# Patient Record
Sex: MALE | Race: WHITE | NOT HISPANIC OR LATINO | Employment: FULL TIME | URBAN - METROPOLITAN AREA
[De-identification: names, ages, dates, MRNs, and addresses within clinical notes are randomized per-mention and may not be internally consistent; named-entity substitution may affect disease eponyms.]

---

## 2023-02-11 PROBLEM — F32.A DEPRESSION: Status: ACTIVE | Noted: 2023-02-11

## 2023-02-11 PROBLEM — S83.207A TEAR OF MENISCUS OF LEFT KNEE: Status: ACTIVE | Noted: 2023-02-11

## 2023-02-11 PROBLEM — R22.9 SKIN NODULE: Status: ACTIVE | Noted: 2023-02-11

## 2023-02-11 PROBLEM — F41.9 ANXIETY: Status: ACTIVE | Noted: 2023-02-11

## 2023-02-11 PROBLEM — E78.5 HYPERLIPIDEMIA: Status: ACTIVE | Noted: 2023-02-11

## 2023-02-11 PROBLEM — R73.9 HYPERGLYCEMIA: Status: ACTIVE | Noted: 2023-02-11

## 2023-02-11 PROBLEM — D22.9 ATYPICAL NEVUS: Status: ACTIVE | Noted: 2023-02-11

## 2023-02-11 PROBLEM — N40.0 PROSTATIC ENLARGEMENT: Status: ACTIVE | Noted: 2023-02-11

## 2023-02-11 PROBLEM — Z87.2 HISTORY OF PSORIASIS: Status: ACTIVE | Noted: 2023-02-11

## 2023-02-11 PROBLEM — N52.9 MALE ERECTILE DISORDER: Status: ACTIVE | Noted: 2023-02-11

## 2023-02-11 PROBLEM — B02.9 SHINGLES: Status: ACTIVE | Noted: 2023-02-11

## 2023-02-11 PROBLEM — I10 HYPERTENSION: Status: ACTIVE | Noted: 2023-02-11

## 2023-02-11 PROBLEM — M25.562 KNEE PAIN, LEFT: Status: ACTIVE | Noted: 2023-02-11

## 2023-02-11 PROBLEM — K21.9 GERD (GASTROESOPHAGEAL REFLUX DISEASE): Status: ACTIVE | Noted: 2023-02-11

## 2023-02-11 PROBLEM — J30.9 ALLERGIC RHINITIS: Status: ACTIVE | Noted: 2023-02-11

## 2023-02-11 PROBLEM — K92.1 HEMATOCHEZIA: Status: ACTIVE | Noted: 2023-02-11

## 2023-02-11 RX ORDER — LISINOPRIL 10 MG/1
TABLET ORAL
COMMUNITY
Start: 2022-02-01 | End: 2023-12-12 | Stop reason: SDUPTHER

## 2023-02-11 RX ORDER — TADALAFIL 20 MG/1
TABLET ORAL
COMMUNITY
Start: 2013-04-20 | End: 2023-05-04 | Stop reason: SDUPTHER

## 2023-02-11 RX ORDER — CLOBETASOL PROPIONATE 0.5 MG/G
CREAM TOPICAL
COMMUNITY
End: 2023-12-12 | Stop reason: ALTCHOICE

## 2023-02-11 RX ORDER — PANTOPRAZOLE SODIUM 40 MG/1
TABLET, DELAYED RELEASE ORAL
COMMUNITY
End: 2023-12-12 | Stop reason: SDUPTHER

## 2023-02-11 RX ORDER — SERTRALINE HYDROCHLORIDE 50 MG/1
TABLET, FILM COATED ORAL
COMMUNITY
Start: 2014-03-31 | End: 2023-12-12 | Stop reason: SDUPTHER

## 2023-02-11 RX ORDER — TRAZODONE HYDROCHLORIDE 100 MG/1
TABLET ORAL
COMMUNITY
Start: 2014-03-31 | End: 2023-12-12 | Stop reason: SDUPTHER

## 2023-02-11 RX ORDER — ATORVASTATIN CALCIUM 10 MG/1
TABLET, FILM COATED ORAL
COMMUNITY
Start: 2022-02-01 | End: 2023-12-12 | Stop reason: SDUPTHER

## 2023-03-14 ENCOUNTER — APPOINTMENT (OUTPATIENT)
Dept: PRIMARY CARE | Facility: CLINIC | Age: 53
End: 2023-03-14

## 2023-05-04 ENCOUNTER — TELEPHONE (OUTPATIENT)
Dept: PRIMARY CARE | Facility: CLINIC | Age: 53
End: 2023-05-04
Payer: COMMERCIAL

## 2023-05-04 DIAGNOSIS — N52.9 MALE ERECTILE DISORDER: ICD-10-CM

## 2023-05-04 NOTE — TELEPHONE ENCOUNTER
Refill:     Tadalafil 20mg take one tab prn     Pharm: [phone]    LR: 01/31/23 qty 18 w/ 1 refill  LV: 03/14/23  NV: no future appt

## 2023-05-05 RX ORDER — TADALAFIL 20 MG/1
TABLET ORAL
Qty: 18 TABLET | Refills: 0 | Status: SHIPPED | OUTPATIENT
Start: 2023-05-05 | End: 2023-06-13 | Stop reason: SDUPTHER

## 2023-06-13 ENCOUNTER — OFFICE VISIT (OUTPATIENT)
Dept: PRIMARY CARE | Facility: CLINIC | Age: 53
End: 2023-06-13
Payer: COMMERCIAL

## 2023-06-13 VITALS
DIASTOLIC BLOOD PRESSURE: 72 MMHG | HEIGHT: 70 IN | BODY MASS INDEX: 23.48 KG/M2 | WEIGHT: 164 LBS | SYSTOLIC BLOOD PRESSURE: 148 MMHG | TEMPERATURE: 98.1 F

## 2023-06-13 DIAGNOSIS — R73.9 HYPERGLYCEMIA: ICD-10-CM

## 2023-06-13 DIAGNOSIS — N52.9 MALE ERECTILE DISORDER: ICD-10-CM

## 2023-06-13 DIAGNOSIS — E78.5 HYPERLIPIDEMIA, UNSPECIFIED HYPERLIPIDEMIA TYPE: ICD-10-CM

## 2023-06-13 PROCEDURE — 99214 OFFICE O/P EST MOD 30 MIN: CPT | Performed by: FAMILY MEDICINE

## 2023-06-13 PROCEDURE — 3078F DIAST BP <80 MM HG: CPT | Performed by: FAMILY MEDICINE

## 2023-06-13 PROCEDURE — 3077F SYST BP >= 140 MM HG: CPT | Performed by: FAMILY MEDICINE

## 2023-06-13 PROCEDURE — 1036F TOBACCO NON-USER: CPT | Performed by: FAMILY MEDICINE

## 2023-06-13 RX ORDER — TADALAFIL 20 MG/1
TABLET ORAL
Qty: 18 TABLET | Refills: 0 | Status: SHIPPED | OUTPATIENT
Start: 2023-06-13 | End: 2023-07-19

## 2023-06-13 NOTE — PROGRESS NOTES
Subjective   Patient ID: [id number]     Trenton Okeefe is a 52 y.o. male who presents for Med Management.    HPI  Having some pain in his knees and hips but is doing ok at work    Review of Systems  GEN-no unexplained fever or chills  OPTH-No dry eyes, itchy eyes, or blurry vision   ENT-No hearing loss, tinnitus or vertigo  NECK-no stiffness, swelling or pain  PSYCH-No complaints regarding libido, appetite, memory or concentration;  no drug use or alcohol usage over six per week  SLEEP-No complaints of insomnia, apnea or restless legs  ALL/IMMUN-No history of sneezing or itching  HEM-No unexplained bruising or bleeding    Objective     /72 (BP Location: Right arm, Patient Position: Sitting)   Temp 36.7 °C (98.1 °F) (Oral)   Wt 74.4 kg (164 lb)      Physical Exam  Eyes-pupils equal round, reactive to light and accommodation, fundi with normal cup/disc ratio, conjunctiva without redness or discharge  General- well defined, well nourished, well hydrated individual in NAD  Skin- normal color and turgor; without nail pitting  Head-normocephalic without masses or tenderness  Ears-normal pinnae, and canals, with normal landmarks and light reflex of tympanic membranes bilaterally  Nose-septum in the midline, normal mucosa bilaterally  Throat- without erythema or exudate, uvula in midlineNeck-supple without lymphadenopathy or thyromegaly; no carotid bruits  Heart- regular rate and rhythm, normal s1 and s2 without murmur or gallop  Lungs-clear to auscultation  Abdomen-soft, positive bowel sounds, without masses, HSmegaly or pain   Assessment/Plan     Problem List Items Addressed This Visit          Genitourinary    Male erectile disorder       Other    Hyperglycemia    Hyperlipidemia     You still need COVID vaccines.    Follow up fasting (no alcohol for 48 hours and just water for 14 hours) in six months for your next routine appointment.  In general, take any medications on schedule (except for types of Insulin).       Benjamin Quintero MD

## 2023-06-13 NOTE — PATIENT INSTRUCTIONS
You still need COVID vaccines.    Please return for your fasting LIPIDS and GLUCOSE.    Follow up fasting (no alcohol for 48 hours and just water for 14 hours) in six months for your next routine appointment.  In general, take any medications on schedule (except for types of Insulin).

## 2023-07-18 DIAGNOSIS — N52.9 MALE ERECTILE DISORDER: ICD-10-CM

## 2023-07-19 RX ORDER — TADALAFIL 20 MG/1
TABLET ORAL
Qty: 18 TABLET | Refills: 0 | Status: SHIPPED | OUTPATIENT
Start: 2023-07-19 | End: 2023-09-18 | Stop reason: SDUPTHER

## 2023-09-18 ENCOUNTER — TELEPHONE (OUTPATIENT)
Dept: PRIMARY CARE | Facility: CLINIC | Age: 53
End: 2023-09-18
Payer: COMMERCIAL

## 2023-09-18 DIAGNOSIS — N52.9 MALE ERECTILE DISORDER: ICD-10-CM

## 2023-09-18 NOTE — TELEPHONE ENCOUNTER
Refill:    Tadalafil 20 mg prn     Pharm: [facility] # [phone]    LR: 07/19/23 qty 18 no refills  LV: 06/13/23   NV: 12/12/23

## 2023-09-19 RX ORDER — TADALAFIL 20 MG/1
TABLET ORAL
Qty: 18 TABLET | Refills: 0 | Status: SHIPPED | OUTPATIENT
Start: 2023-09-19 | End: 2023-11-15 | Stop reason: SDUPTHER

## 2023-11-15 ENCOUNTER — TELEPHONE (OUTPATIENT)
Dept: PRIMARY CARE | Facility: CLINIC | Age: 53
End: 2023-11-15
Payer: COMMERCIAL

## 2023-11-15 DIAGNOSIS — N52.9 MALE ERECTILE DISORDER: ICD-10-CM

## 2023-11-15 RX ORDER — TADALAFIL 20 MG/1
TABLET ORAL
Qty: 18 TABLET | Refills: 1 | Status: SHIPPED | OUTPATIENT
Start: 2023-11-15 | End: 2023-12-12 | Stop reason: SDUPTHER

## 2023-12-11 PROBLEM — Z23 ENCOUNTER FOR IMMUNIZATION: Status: ACTIVE | Noted: 2023-12-11

## 2023-12-11 PROBLEM — B02.9 SHINGLES: Status: RESOLVED | Noted: 2023-02-11 | Resolved: 2023-12-11

## 2023-12-12 ENCOUNTER — OFFICE VISIT (OUTPATIENT)
Dept: PRIMARY CARE | Facility: CLINIC | Age: 53
End: 2023-12-12
Payer: COMMERCIAL

## 2023-12-12 VITALS
DIASTOLIC BLOOD PRESSURE: 85 MMHG | SYSTOLIC BLOOD PRESSURE: 138 MMHG | TEMPERATURE: 98.2 F | BODY MASS INDEX: 22.67 KG/M2 | WEIGHT: 158 LBS

## 2023-12-12 DIAGNOSIS — I10 HYPERTENSION, UNSPECIFIED TYPE: Primary | ICD-10-CM

## 2023-12-12 DIAGNOSIS — N52.9 MALE ERECTILE DISORDER: ICD-10-CM

## 2023-12-12 DIAGNOSIS — Z23 ENCOUNTER FOR IMMUNIZATION: ICD-10-CM

## 2023-12-12 DIAGNOSIS — R20.2 PARESTHESIA OF BOTH FEET: ICD-10-CM

## 2023-12-12 DIAGNOSIS — F32.A DEPRESSION, UNSPECIFIED DEPRESSION TYPE: ICD-10-CM

## 2023-12-12 DIAGNOSIS — K21.9 GASTROESOPHAGEAL REFLUX DISEASE, UNSPECIFIED WHETHER ESOPHAGITIS PRESENT: ICD-10-CM

## 2023-12-12 DIAGNOSIS — E78.5 HYPERLIPIDEMIA, UNSPECIFIED HYPERLIPIDEMIA TYPE: ICD-10-CM

## 2023-12-12 DIAGNOSIS — N42.89 PROSTATE ASYMMETRY: ICD-10-CM

## 2023-12-12 PROCEDURE — 3079F DIAST BP 80-89 MM HG: CPT | Performed by: FAMILY MEDICINE

## 2023-12-12 PROCEDURE — 99214 OFFICE O/P EST MOD 30 MIN: CPT | Performed by: FAMILY MEDICINE

## 2023-12-12 PROCEDURE — 1036F TOBACCO NON-USER: CPT | Performed by: FAMILY MEDICINE

## 2023-12-12 PROCEDURE — 3075F SYST BP GE 130 - 139MM HG: CPT | Performed by: FAMILY MEDICINE

## 2023-12-12 RX ORDER — ATORVASTATIN CALCIUM 10 MG/1
10 TABLET, FILM COATED ORAL NIGHTLY
Qty: 90 TABLET | Refills: 1 | Status: SHIPPED | OUTPATIENT
Start: 2023-12-12 | End: 2024-06-09

## 2023-12-12 RX ORDER — TRAZODONE HYDROCHLORIDE 100 MG/1
100 TABLET ORAL NIGHTLY
Qty: 90 TABLET | Refills: 1 | Status: SHIPPED | OUTPATIENT
Start: 2023-12-12 | End: 2024-06-09

## 2023-12-12 RX ORDER — SERTRALINE HYDROCHLORIDE 50 MG/1
50 TABLET, FILM COATED ORAL DAILY
Qty: 90 TABLET | Refills: 1 | Status: SHIPPED | OUTPATIENT
Start: 2023-12-12 | End: 2024-06-09

## 2023-12-12 RX ORDER — TADALAFIL 20 MG/1
TABLET ORAL
Qty: 18 TABLET | Refills: 1 | Status: SHIPPED | OUTPATIENT
Start: 2023-12-12 | End: 2024-03-08 | Stop reason: SDUPTHER

## 2023-12-12 RX ORDER — PANTOPRAZOLE SODIUM 40 MG/1
40 TABLET, DELAYED RELEASE ORAL
Qty: 90 TABLET | Refills: 1 | Status: SHIPPED | OUTPATIENT
Start: 2023-12-12 | End: 2024-03-08 | Stop reason: ALTCHOICE

## 2023-12-12 RX ORDER — LISINOPRIL 10 MG/1
10 TABLET ORAL DAILY
Qty: 90 TABLET | Refills: 1 | Status: SHIPPED | OUTPATIENT
Start: 2023-12-12 | End: 2024-06-09

## 2023-12-12 NOTE — PATIENT INSTRUCTIONS
You are eligible for the COVID booster.    Please check with your insurance to verify whether you should get your shingles vaccination here or at the pharmacy, and whether it is covered.  The purpose of this shot is to prevent the permanent unremitting pain of Post Herpetic Neuralgia which becomes more common in elderly patients that get Shingles.  This shot can be very expensive.    Follow up fasting (no alcohol for 48 hours and just water for 14 hours) in six months for your next routine appointment.  In general, take any medications on schedule (except for types of Insulin).

## 2023-12-12 NOTE — PROGRESS NOTES
"Subjective   Patient ID: [id number]     Trenton Okeefe is a 53 y.o. male who presents for Med Management.    HPI  Taking meds as directed without tolerability or affordability issues; no complaints today.    Review of Systems  CARDIO- No chest pain or pressure, nausea, diaphoresis, paresthesias, dizziness, or syncope with or without exertion  GI-No blood in stool, tarry stools, pain, vomiting, heartburn, constipation or diarrhea  PULM-No wheezing, coughing or shortness of breath  UROL-No frequency, urgency, blood in urine, or incontinence  ENDO- No change in hair, voice, skin, weight or temperature tolerance   MSK-No locking, giving way/swelling of joints; right knee can be stiff  NEURO- No daily headaches, hx of concussion, falls in the last year or seizure;  both feet feel \"tingly\" for approximately six months  DERM-No rashes, blanching or change in any moles    Objective     /85 (BP Location: Right arm, Patient Position: Sitting, BP Cuff Size: Adult)   Temp 36.8 °C (98.2 °F) (Oral)   Wt 71.7 kg (158 lb)   BMI 22.67 kg/m²      Physical Exam  Neck-supple without lymphadenopathy or thyromegaly; no carotid bruits  Throat- without erythema or exudate, uvula in midlineNeck-supple without lymphadenopathy or thyromegaly; no carotid bruits  Heart- regular rate and rhythm, normal s1 and s2 without murmur or gallop  Lungs-clear to auscultation  Abdomen-soft, positive bowel sounds, without masses, HSmegaly or pain   Male Genitalia-circumcised penis and normal testes bilaterally without hernia   Rectal- normal ampulla and anus; hemetest negative; prostate smooth, normal size, and slightly larger on the right  Foot Exam-normal propioceptive, vibration and monofilament;  normal pulses, temperature and reflexes; normal hair distribution, skin integrity and color    Assessment/Plan     Problem List Items Addressed This Visit       Depression    Relevant Medications    sertraline (Zoloft) 50 mg tablet    traZODone (Desyrel) " 100 mg tablet    Hyperlipidemia    Relevant Medications    atorvastatin (Lipitor) 10 mg tablet    Other Relevant Orders    Lipid panel    Hypertension - Primary    Relevant Medications    lisinopril 10 mg tablet    Other Relevant Orders    TSH    Male erectile disorder    Relevant Medications    tadalafil 20 mg tablet    GERD (gastroesophageal reflux disease)    Relevant Medications    pantoprazole (ProtoNix) 40 mg EC tablet    Encounter for immunization    Prostate asymmetry    Relevant Orders    PSA     Other Visit Diagnoses       Paresthesia of both feet        Relevant Orders    Folate    CBC    Vitamin B12          You are eligible for the COVID & FLU booster.    Please check with your insurance to verify whether you should get your shingles vaccination here or at the pharmacy, and whether it is covered.  The purpose of this shot is to prevent the permanent unremitting pain of Post Herpetic Neuralgia which becomes more common in elderly patients that get Shingles.  This shot can be very expensive.    Follow up fasting (no alcohol for 48 hours and just water for 14 hours) in six months for your next routine appointment.  In general, take any medications on schedule (except for types of Insulin).      Benjamin Quintero MD

## 2024-03-01 ENCOUNTER — APPOINTMENT (OUTPATIENT)
Dept: CARDIOLOGY | Facility: HOSPITAL | Age: 54
DRG: 897 | End: 2024-03-01
Payer: COMMERCIAL

## 2024-03-01 ENCOUNTER — HOSPITAL ENCOUNTER (OUTPATIENT)
Facility: HOSPITAL | Age: 54
Setting detail: OBSERVATION
Discharge: HOME | DRG: 897 | End: 2024-03-02
Attending: STUDENT IN AN ORGANIZED HEALTH CARE EDUCATION/TRAINING PROGRAM | Admitting: INTERNAL MEDICINE
Payer: COMMERCIAL

## 2024-03-01 ENCOUNTER — APPOINTMENT (OUTPATIENT)
Dept: RADIOLOGY | Facility: HOSPITAL | Age: 54
DRG: 897 | End: 2024-03-01
Payer: COMMERCIAL

## 2024-03-01 DIAGNOSIS — F10.930 ALCOHOL WITHDRAWAL SYNDROME WITHOUT COMPLICATION (MULTI): ICD-10-CM

## 2024-03-01 DIAGNOSIS — R44.3 HALLUCINATION: Primary | ICD-10-CM

## 2024-03-01 DIAGNOSIS — I10 PRIMARY HYPERTENSION: ICD-10-CM

## 2024-03-01 DIAGNOSIS — F10.932: ICD-10-CM

## 2024-03-01 PROBLEM — Z86.16 HISTORY OF COVID-19: Status: ACTIVE | Noted: 2024-03-01

## 2024-03-01 PROBLEM — F10.20 ETOH DEPENDENCE (MULTI): Status: ACTIVE | Noted: 2024-03-01

## 2024-03-01 LAB
ALBUMIN SERPL BCP-MCNC: 4.2 G/DL (ref 3.4–5)
ALP SERPL-CCNC: 66 U/L (ref 33–120)
ALT SERPL W P-5'-P-CCNC: 35 U/L (ref 10–52)
AMMONIA PLAS-SCNC: 39 UMOL/L (ref 16–53)
ANION GAP BLDV CALCULATED.4IONS-SCNC: 9 MMOL/L (ref 10–25)
ANION GAP SERPL CALC-SCNC: 12 MMOL/L (ref 10–20)
APAP SERPL-MCNC: <10 UG/ML
AST SERPL W P-5'-P-CCNC: 31 U/L (ref 9–39)
BASE EXCESS BLDV CALC-SCNC: 2.8 MMOL/L (ref -2–3)
BASOPHILS # BLD MANUAL: 0.03 X10*3/UL (ref 0–0.1)
BASOPHILS NFR BLD MANUAL: 1 %
BILIRUB SERPL-MCNC: 0.6 MG/DL (ref 0–1.2)
BODY TEMPERATURE: ABNORMAL
BUN SERPL-MCNC: 9 MG/DL (ref 6–23)
CA-I BLDV-SCNC: 1.14 MMOL/L (ref 1.1–1.33)
CALCIUM SERPL-MCNC: 8.8 MG/DL (ref 8.6–10.3)
CARDIAC TROPONIN I PNL SERPL HS: 5 NG/L (ref 0–20)
CHLORIDE BLDV-SCNC: 103 MMOL/L (ref 98–107)
CHLORIDE SERPL-SCNC: 106 MMOL/L (ref 98–107)
CO2 SERPL-SCNC: 25 MMOL/L (ref 21–32)
CREAT SERPL-MCNC: 0.58 MG/DL (ref 0.5–1.3)
EGFRCR SERPLBLD CKD-EPI 2021: >90 ML/MIN/1.73M*2
EOSINOPHIL # BLD MANUAL: 0.03 X10*3/UL (ref 0–0.7)
EOSINOPHIL NFR BLD MANUAL: 1 %
ERYTHROCYTE [DISTWIDTH] IN BLOOD BY AUTOMATED COUNT: 12.3 % (ref 11.5–14.5)
ETHANOL SERPL-MCNC: <10 MG/DL
GLUCOSE BLDV-MCNC: 124 MG/DL (ref 74–99)
GLUCOSE SERPL-MCNC: 121 MG/DL (ref 74–99)
HCO3 BLDV-SCNC: 27.9 MMOL/L (ref 22–26)
HCT VFR BLD AUTO: 49 % (ref 41–52)
HCT VFR BLD EST: 54 % (ref 41–52)
HGB BLD-MCNC: 16.5 G/DL (ref 13.5–17.5)
HGB BLDV-MCNC: 18 G/DL (ref 13.5–17.5)
IMM GRANULOCYTES # BLD AUTO: 0.01 X10*3/UL (ref 0–0.7)
IMM GRANULOCYTES NFR BLD AUTO: 0.3 % (ref 0–0.9)
INHALED O2 CONCENTRATION: 21 %
LACTATE BLDV-SCNC: 1.3 MMOL/L (ref 0.4–2)
LYMPHOCYTES # BLD MANUAL: 0.48 X10*3/UL (ref 1.2–4.8)
LYMPHOCYTES NFR BLD MANUAL: 15 %
MCH RBC QN AUTO: 32.7 PG (ref 26–34)
MCHC RBC AUTO-ENTMCNC: 33.7 G/DL (ref 32–36)
MCV RBC AUTO: 97 FL (ref 80–100)
MONOCYTES # BLD MANUAL: 0.29 X10*3/UL (ref 0.1–1)
MONOCYTES NFR BLD MANUAL: 9 %
NEUTS SEG # BLD MANUAL: 2.37 X10*3/UL (ref 1.2–7)
NEUTS SEG NFR BLD MANUAL: 74 %
NRBC BLD-RTO: 0 /100 WBCS (ref 0–0)
OXYHGB MFR BLDV: 91.6 % (ref 45–75)
PCO2 BLDV: 43 MM HG (ref 41–51)
PH BLDV: 7.42 PH (ref 7.33–7.43)
PLATELET # BLD AUTO: 117 X10*3/UL (ref 150–450)
PO2 BLDV: 67 MM HG (ref 35–45)
POTASSIUM BLDV-SCNC: 3.5 MMOL/L (ref 3.5–5.3)
POTASSIUM SERPL-SCNC: 3.7 MMOL/L (ref 3.5–5.3)
PROT SERPL-MCNC: 7.3 G/DL (ref 6.4–8.2)
RBC # BLD AUTO: 5.05 X10*6/UL (ref 4.5–5.9)
RBC MORPH BLD: ABNORMAL
SALICYLATES SERPL-MCNC: <3 MG/DL
SAO2 % BLDV: 94 % (ref 45–75)
SARS-COV-2 RNA RESP QL NAA+PROBE: NOT DETECTED
SODIUM BLDV-SCNC: 136 MMOL/L (ref 136–145)
SODIUM SERPL-SCNC: 139 MMOL/L (ref 136–145)
TOTAL CELLS COUNTED BLD: 100
WBC # BLD AUTO: 3.2 X10*3/UL (ref 4.4–11.3)

## 2024-03-01 PROCEDURE — 71045 X-RAY EXAM CHEST 1 VIEW: CPT

## 2024-03-01 PROCEDURE — 93010 ELECTROCARDIOGRAM REPORT: CPT | Performed by: STUDENT IN AN ORGANIZED HEALTH CARE EDUCATION/TRAINING PROGRAM

## 2024-03-01 PROCEDURE — 85018 HEMOGLOBIN: CPT

## 2024-03-01 PROCEDURE — 82140 ASSAY OF AMMONIA: CPT

## 2024-03-01 PROCEDURE — 2060000001 HC INTERMEDIATE ICU ROOM DAILY

## 2024-03-01 PROCEDURE — 84132 ASSAY OF SERUM POTASSIUM: CPT

## 2024-03-01 PROCEDURE — 96374 THER/PROPH/DIAG INJ IV PUSH: CPT

## 2024-03-01 PROCEDURE — 85060 BLOOD SMEAR INTERPRETATION: CPT

## 2024-03-01 PROCEDURE — 85027 COMPLETE CBC AUTOMATED: CPT

## 2024-03-01 PROCEDURE — 2500000004 HC RX 250 GENERAL PHARMACY W/ HCPCS (ALT 636 FOR OP/ED)

## 2024-03-01 PROCEDURE — G0378 HOSPITAL OBSERVATION PER HR: HCPCS

## 2024-03-01 PROCEDURE — 71045 X-RAY EXAM CHEST 1 VIEW: CPT | Performed by: RADIOLOGY

## 2024-03-01 PROCEDURE — 87635 SARS-COV-2 COVID-19 AMP PRB: CPT

## 2024-03-01 PROCEDURE — 2500000001 HC RX 250 WO HCPCS SELF ADMINISTERED DRUGS (ALT 637 FOR MEDICARE OP)

## 2024-03-01 PROCEDURE — 36415 COLL VENOUS BLD VENIPUNCTURE: CPT

## 2024-03-01 PROCEDURE — 70450 CT HEAD/BRAIN W/O DYE: CPT

## 2024-03-01 PROCEDURE — 99222 1ST HOSP IP/OBS MODERATE 55: CPT

## 2024-03-01 PROCEDURE — 70450 CT HEAD/BRAIN W/O DYE: CPT | Performed by: RADIOLOGY

## 2024-03-01 PROCEDURE — 80143 DRUG ASSAY ACETAMINOPHEN: CPT

## 2024-03-01 PROCEDURE — 93005 ELECTROCARDIOGRAM TRACING: CPT

## 2024-03-01 PROCEDURE — 99285 EMERGENCY DEPT VISIT HI MDM: CPT | Performed by: STUDENT IN AN ORGANIZED HEALTH CARE EDUCATION/TRAINING PROGRAM

## 2024-03-01 PROCEDURE — 96375 TX/PRO/DX INJ NEW DRUG ADDON: CPT

## 2024-03-01 PROCEDURE — 99285 EMERGENCY DEPT VISIT HI MDM: CPT | Mod: 25

## 2024-03-01 PROCEDURE — 96376 TX/PRO/DX INJ SAME DRUG ADON: CPT

## 2024-03-01 PROCEDURE — 84484 ASSAY OF TROPONIN QUANT: CPT

## 2024-03-01 PROCEDURE — 85007 BL SMEAR W/DIFF WBC COUNT: CPT

## 2024-03-01 RX ORDER — FOLIC ACID 1 MG/1
1 TABLET ORAL DAILY
Status: DISCONTINUED | OUTPATIENT
Start: 2024-03-01 | End: 2024-03-01

## 2024-03-01 RX ORDER — GABAPENTIN 300 MG/1
300 CAPSULE ORAL 3 TIMES DAILY
Status: DISCONTINUED | OUTPATIENT
Start: 2024-03-01 | End: 2024-03-02 | Stop reason: HOSPADM

## 2024-03-01 RX ORDER — LANOLIN ALCOHOL/MO/W.PET/CERES
100 CREAM (GRAM) TOPICAL DAILY
Status: DISCONTINUED | OUTPATIENT
Start: 2024-03-04 | End: 2024-03-02 | Stop reason: HOSPADM

## 2024-03-01 RX ORDER — MULTIVIT-MIN/IRON FUM/FOLIC AC 7.5 MG-4
1 TABLET ORAL DAILY
Status: DISCONTINUED | OUTPATIENT
Start: 2024-03-01 | End: 2024-03-02 | Stop reason: HOSPADM

## 2024-03-01 RX ORDER — DIAZEPAM 5 MG/ML
INJECTION, SOLUTION INTRAMUSCULAR; INTRAVENOUS
Status: COMPLETED
Start: 2024-03-01 | End: 2024-03-01

## 2024-03-01 RX ORDER — FOLIC ACID 1 MG/1
1 TABLET ORAL DAILY
Status: DISCONTINUED | OUTPATIENT
Start: 2024-03-01 | End: 2024-03-02 | Stop reason: HOSPADM

## 2024-03-01 RX ORDER — THIAMINE HYDROCHLORIDE 100 MG/ML
100 INJECTION, SOLUTION INTRAMUSCULAR; INTRAVENOUS DAILY
Status: DISCONTINUED | OUTPATIENT
Start: 2024-03-01 | End: 2024-03-02 | Stop reason: HOSPADM

## 2024-03-01 RX ORDER — LORAZEPAM 2 MG/ML
1 INJECTION INTRAMUSCULAR ONCE
Status: COMPLETED | OUTPATIENT
Start: 2024-03-01 | End: 2024-03-01

## 2024-03-01 RX ORDER — DIAZEPAM 5 MG/ML
10 INJECTION, SOLUTION INTRAMUSCULAR; INTRAVENOUS EVERY 2 HOUR PRN
Status: DISCONTINUED | OUTPATIENT
Start: 2024-03-01 | End: 2024-03-02

## 2024-03-01 RX ORDER — MULTIVIT-MIN/IRON FUM/FOLIC AC 7.5 MG-4
1 TABLET ORAL DAILY
Status: DISCONTINUED | OUTPATIENT
Start: 2024-03-01 | End: 2024-03-01

## 2024-03-01 RX ORDER — LANOLIN ALCOHOL/MO/W.PET/CERES
100 CREAM (GRAM) TOPICAL DAILY
Status: DISCONTINUED | OUTPATIENT
Start: 2024-03-01 | End: 2024-03-01

## 2024-03-01 RX ADMIN — LORAZEPAM 1 MG: 2 INJECTION INTRAMUSCULAR; INTRAVENOUS at 09:41

## 2024-03-01 RX ADMIN — DIAZEPAM 10 MG: 5 INJECTION INTRAMUSCULAR; INTRAVENOUS at 12:50

## 2024-03-01 RX ADMIN — Medication 1 TABLET: at 15:49

## 2024-03-01 RX ADMIN — DIAZEPAM 10 MG: 5 INJECTION INTRAMUSCULAR; INTRAVENOUS at 10:42

## 2024-03-01 RX ADMIN — FOLIC ACID 1 MG: 1 TABLET ORAL at 15:49

## 2024-03-01 RX ADMIN — GABAPENTIN 300 MG: 300 CAPSULE ORAL at 20:28

## 2024-03-01 RX ADMIN — GABAPENTIN 300 MG: 300 CAPSULE ORAL at 15:49

## 2024-03-01 RX ADMIN — DIAZEPAM 10 MG: 5 INJECTION INTRAMUSCULAR; INTRAVENOUS at 20:29

## 2024-03-01 RX ADMIN — THIAMINE HYDROCHLORIDE 100 MG: 100 INJECTION, SOLUTION INTRAMUSCULAR; INTRAVENOUS at 15:46

## 2024-03-01 SDOH — SOCIAL STABILITY: SOCIAL INSECURITY: DOES ANYONE TRY TO KEEP YOU FROM HAVING/CONTACTING OTHER FRIENDS OR DOING THINGS OUTSIDE YOUR HOME?: NO

## 2024-03-01 SDOH — SOCIAL STABILITY: SOCIAL INSECURITY: HAS ANYONE EVER THREATENED TO HURT YOUR FAMILY OR YOUR PETS?: NO

## 2024-03-01 SDOH — SOCIAL STABILITY: SOCIAL INSECURITY: ABUSE: ADULT

## 2024-03-01 SDOH — SOCIAL STABILITY: SOCIAL INSECURITY: ARE YOU OR HAVE YOU BEEN THREATENED OR ABUSED PHYSICALLY, EMOTIONALLY, OR SEXUALLY BY ANYONE?: NO

## 2024-03-01 SDOH — SOCIAL STABILITY: SOCIAL INSECURITY: ARE THERE ANY APPARENT SIGNS OF INJURIES/BEHAVIORS THAT COULD BE RELATED TO ABUSE/NEGLECT?: NO

## 2024-03-01 SDOH — SOCIAL STABILITY: SOCIAL INSECURITY: HAVE YOU HAD THOUGHTS OF HARMING ANYONE ELSE?: NO

## 2024-03-01 SDOH — SOCIAL STABILITY: SOCIAL INSECURITY: DO YOU FEEL ANYONE HAS EXPLOITED OR TAKEN ADVANTAGE OF YOU FINANCIALLY OR OF YOUR PERSONAL PROPERTY?: NO

## 2024-03-01 SDOH — SOCIAL STABILITY: SOCIAL INSECURITY: DO YOU FEEL UNSAFE GOING BACK TO THE PLACE WHERE YOU ARE LIVING?: NO

## 2024-03-01 ASSESSMENT — COGNITIVE AND FUNCTIONAL STATUS - GENERAL
DRESSING REGULAR LOWER BODY CLOTHING: A LITTLE
DRESSING REGULAR UPPER BODY CLOTHING: A LITTLE
DRESSING REGULAR UPPER BODY CLOTHING: A LITTLE
CLIMB 3 TO 5 STEPS WITH RAILING: A LITTLE
HELP NEEDED FOR BATHING: A LITTLE
DRESSING REGULAR LOWER BODY CLOTHING: A LITTLE
MOVING TO AND FROM BED TO CHAIR: A LITTLE
DAILY ACTIVITIY SCORE: 18
MOVING TO AND FROM BED TO CHAIR: A LITTLE
CLIMB 3 TO 5 STEPS WITH RAILING: A LOT
PATIENT BASELINE BEDBOUND: NO
DAILY ACTIVITIY SCORE: 18
STANDING UP FROM CHAIR USING ARMS: A LITTLE
WALKING IN HOSPITAL ROOM: A LITTLE
TOILETING: A LITTLE
STANDING UP FROM CHAIR USING ARMS: A LITTLE
PERSONAL GROOMING: A LITTLE
TOILETING: A LITTLE
WALKING IN HOSPITAL ROOM: A LITTLE
DRESSING REGULAR LOWER BODY CLOTHING: A LITTLE
MOBILITY SCORE: 19
EATING MEALS: A LITTLE
WALKING IN HOSPITAL ROOM: A LITTLE
STANDING UP FROM CHAIR USING ARMS: A LITTLE
DAILY ACTIVITIY SCORE: 18
TOILETING: A LITTLE
HELP NEEDED FOR BATHING: A LITTLE
MOBILITY SCORE: 20
DRESSING REGULAR UPPER BODY CLOTHING: A LITTLE
EATING MEALS: A LITTLE
CLIMB 3 TO 5 STEPS WITH RAILING: A LOT
PERSONAL GROOMING: A LITTLE
PERSONAL GROOMING: A LITTLE
EATING MEALS: A LITTLE
MOBILITY SCORE: 19
MOVING TO AND FROM BED TO CHAIR: A LITTLE
HELP NEEDED FOR BATHING: A LITTLE

## 2024-03-01 ASSESSMENT — LIFESTYLE VARIABLES
HOW OFTEN DURING THE LAST YEAR HAVE YOU FOUND THAT YOU WERE NOT ABLE TO STOP DRINKING ONCE YOU HAD STARTED: DAILY OR ALMOST DAILY
HAVE YOU EVER FELT YOU SHOULD CUT DOWN ON YOUR DRINKING: NO
HAS A RELATIVE, FRIEND, DOCTOR, OR ANOTHER HEALTH PROFESSIONAL EXPRESSED CONCERN ABOUT YOUR DRINKING OR SUGGESTED YOU CUT DOWN: YES, DURING THE LAST YEAR
EVER HAD A DRINK FIRST THING IN THE MORNING TO STEADY YOUR NERVES TO GET RID OF A HANGOVER: NO
VISUAL DISTURBANCES: NOT PRESENT
AUDIT-C TOTAL SCORE: 10
ANXIETY: 5
HAVE YOU OR SOMEONE ELSE BEEN INJURED AS A RESULT OF YOUR DRINKING: NO
EVER FELT BAD OR GUILTY ABOUT YOUR DRINKING: NO
ANXIETY: 3
SKIP TO QUESTIONS 9-10: 0
HEADACHE, FULLNESS IN HEAD: NOT PRESENT
PULSE: 96
PAROXYSMAL SWEATS: NO SWEAT VISIBLE
BLOOD PRESSURE: 142/95
NAUSEA AND VOMITING: NO NAUSEA AND NO VOMITING
TREMOR: MODERATE, WITH PATIENT'S ARMS EXTENDED
VISUAL DISTURBANCES: NOT PRESENT
VISUAL DISTURBANCES: NOT PRESENT
VISUAL DISTURBANCES: VERY MILD SENSITIVITY
AGITATION: SOMEWHAT MORE THAN NORMAL ACTIVITY
HOW OFTEN DO YOU HAVE 6 OR MORE DRINKS ON ONE OCCASION: DAILY OR ALMOST DAILY
ORIENTATION AND CLOUDING OF SENSORIUM: ORIENTED AND CAN DO SERIAL ADDITIONS
VISUAL DISTURBANCES: NOT PRESENT
AGITATION: 2
AUDIT TOTAL SCORE: 21
HOW MANY STANDARD DRINKS CONTAINING ALCOHOL DO YOU HAVE ON A TYPICAL DAY: 5 OR 6
ORIENTATION AND CLOUDING OF SENSORIUM: ORIENTED AND CAN DO SERIAL ADDITIONS
AUDIT TOTAL SCORE: 31
HOW OFTEN DURING THE LAST YEAR HAVE YOU NEEDED AN ALCOHOLIC DRINK FIRST THING IN THE MORNING TO GET YOURSELF GOING AFTER A NIGHT OF HEAVY DRINKING: WEEKLY
AUDITORY DISTURBANCES: NOT PRESENT
AUDITORY DISTURBANCES: NOT PRESENT
VISUAL DISTURBANCES: MILD SENSITIVITY
NAUSEA AND VOMITING: NO NAUSEA AND NO VOMITING
HOW OFTEN DO YOU HAVE A DRINK CONTAINING ALCOHOL: 4 OR MORE TIMES A WEEK
AUDITORY DISTURBANCES: NOT PRESENT
TREMOR: NO TREMOR
TOTAL SCORE: 4
HAVE PEOPLE ANNOYED YOU BY CRITICIZING YOUR DRINKING: NO
TOTAL SCORE: 4
HOW OFTEN DURING THE LAST YEAR HAVE YOU HAD A FEELING OF GUILT OR REMORSE AFTER DRINKING: DAILY OR ALMOST DAILY
TREMOR: NO TREMOR
TREMOR: 6
AUDITORY DISTURBANCES: NOT PRESENT
TOTAL SCORE: 13
AUDITORY DISTURBANCES: NOT PRESENT
ANXIETY: MILDLY ANXIOUS
PAROXYSMAL SWEATS: NO SWEAT VISIBLE
ANXIETY: MILDLY ANXIOUS
HEADACHE, FULLNESS IN HEAD: NOT PRESENT
AUDITORY DISTURBANCES: MILD HARSHNESS OR ABILITY TO FRIGHTEN
ANXIETY: NO ANXIETY, AT EASE
PRESCIPTION_ABUSE_PAST_12_MONTHS: NO
TREMOR: NOT VISIBLE, BUT CAN BE FELT FINGERTIP TO FINGERTIP
HEADACHE, FULLNESS IN HEAD: NOT PRESENT
NAUSEA AND VOMITING: NO NAUSEA AND NO VOMITING
TREMOR: 2
PULSE: 85
AGITATION: NORMAL ACTIVITY
PAROXYSMAL SWEATS: BARELY PERCEPTIBLE SWEATING, PALMS MOIST
HOW OFTEN DURING THE LAST YEAR HAVE YOU BEEN UNABLE TO REMEMBER WHAT HAPPENED THE NIGHT BEFORE BECAUSE YOU HAD BEEN DRINKING: WEEKLY
HEADACHE, FULLNESS IN HEAD: NOT PRESENT
NAUSEA AND VOMITING: NO NAUSEA AND NO VOMITING
ORIENTATION AND CLOUDING OF SENSORIUM: ORIENTED AND CAN DO SERIAL ADDITIONS
AUDIT-C TOTAL SCORE: 10
AGITATION: NORMAL ACTIVITY
AUDITORY DISTURBANCES: VERY MILD HARSHNESS OR ABILITY TO FRIGHTEN
PAROXYSMAL SWEATS: NO SWEAT VISIBLE
ORIENTATION AND CLOUDING OF SENSORIUM: ORIENTED AND CAN DO SERIAL ADDITIONS
TOTAL SCORE: 4
PAROXYSMAL SWEATS: BARELY PERCEPTIBLE SWEATING, PALMS MOIST
PAROXYSMAL SWEATS: NO SWEAT VISIBLE
AGITATION: NORMAL ACTIVITY
VISUAL DISTURBANCES: NOT PRESENT
PAROXYSMAL SWEATS: NO SWEAT VISIBLE
HEADACHE, FULLNESS IN HEAD: NOT PRESENT
AGITATION: MODERATELY FIDGETY AND RESTLESS
NAUSEA AND VOMITING: NO NAUSEA AND NO VOMITING
SUBSTANCE_ABUSE_PAST_12_MONTHS: NO
ANXIETY: MODERATELY ANXIOUS, OR GUARDED, SO ANXIETY IS INFERRED
TOTAL SCORE: 4
HEADACHE, FULLNESS IN HEAD: NOT PRESENT
ORIENTATION AND CLOUDING OF SENSORIUM: ORIENTED AND CAN DO SERIAL ADDITIONS
HOW OFTEN DURING THE LAST YEAR HAVE YOU FAILED TO DO WHAT WAS NORMALLY EXPECTED FROM YOU BECAUSE OF DRINKING: WEEKLY
TOTAL SCORE: 17
ORIENTATION AND CLOUDING OF SENSORIUM: ORIENTED AND CAN DO SERIAL ADDITIONS
HEADACHE, FULLNESS IN HEAD: NOT PRESENT
ANXIETY: 3
NAUSEA AND VOMITING: NO NAUSEA AND NO VOMITING
ORIENTATION AND CLOUDING OF SENSORIUM: ORIENTED AND CAN DO SERIAL ADDITIONS
NAUSEA AND VOMITING: NO NAUSEA AND NO VOMITING
TOTAL SCORE: 9
AGITATION: MODERATELY FIDGETY AND RESTLESS
TREMOR: 6

## 2024-03-01 ASSESSMENT — PAIN - FUNCTIONAL ASSESSMENT
PAIN_FUNCTIONAL_ASSESSMENT: 0-10

## 2024-03-01 ASSESSMENT — ENCOUNTER SYMPTOMS
NUMBNESS: 0
NAUSEA: 0
SHORTNESS OF BREATH: 0
POLYDIPSIA: 0
HYPERACTIVE: 0
HEADACHES: 0
FEVER: 0
LIGHT-HEADEDNESS: 0
MYALGIAS: 0
POLYPHAGIA: 0
DIFFICULTY URINATING: 0
DIZZINESS: 0
SORE THROAT: 0
HALLUCINATIONS: 1
BACK PAIN: 0
TREMORS: 1
PHOTOPHOBIA: 0
SEIZURES: 0
COLOR CHANGE: 0
CHOKING: 0
SINUS PRESSURE: 0
SPEECH DIFFICULTY: 0
UNEXPECTED WEIGHT CHANGE: 0
DYSURIA: 0
ARTHRALGIAS: 0
CONFUSION: 1
WEAKNESS: 0
WHEEZING: 0
VOMITING: 0
FREQUENCY: 0
COUGH: 0
PALPITATIONS: 0
ABDOMINAL PAIN: 0
DIARRHEA: 0

## 2024-03-01 ASSESSMENT — PAIN SCALES - GENERAL
PAINLEVEL_OUTOF10: 0 - NO PAIN

## 2024-03-01 ASSESSMENT — ACTIVITIES OF DAILY LIVING (ADL)
HEARING - RIGHT EAR: FUNCTIONAL
ASSISTIVE_DEVICE: OTHER (COMMENT)
ADEQUATE_TO_COMPLETE_ADL: YES
TOILETING: NEEDS ASSISTANCE
BATHING: NEEDS ASSISTANCE
FEEDING YOURSELF: NEEDS ASSISTANCE
GROOMING: NEEDS ASSISTANCE
PATIENT'S MEMORY ADEQUATE TO SAFELY COMPLETE DAILY ACTIVITIES?: NO
HEARING - LEFT EAR: FUNCTIONAL
WALKS IN HOME: NEEDS ASSISTANCE
JUDGMENT_ADEQUATE_SAFELY_COMPLETE_DAILY_ACTIVITIES: NO
LACK_OF_TRANSPORTATION: NO
DRESSING YOURSELF: NEEDS ASSISTANCE

## 2024-03-01 ASSESSMENT — COLUMBIA-SUICIDE SEVERITY RATING SCALE - C-SSRS
2. HAVE YOU ACTUALLY HAD ANY THOUGHTS OF KILLING YOURSELF?: NO
1. IN THE PAST MONTH, HAVE YOU WISHED YOU WERE DEAD OR WISHED YOU COULD GO TO SLEEP AND NOT WAKE UP?: NO
1. IN THE PAST MONTH, HAVE YOU WISHED YOU WERE DEAD OR WISHED YOU COULD GO TO SLEEP AND NOT WAKE UP?: NO
6. HAVE YOU EVER DONE ANYTHING, STARTED TO DO ANYTHING, OR PREPARED TO DO ANYTHING TO END YOUR LIFE?: NO
6. HAVE YOU EVER DONE ANYTHING, STARTED TO DO ANYTHING, OR PREPARED TO DO ANYTHING TO END YOUR LIFE?: NO

## 2024-03-01 ASSESSMENT — PATIENT HEALTH QUESTIONNAIRE - PHQ9
1. LITTLE INTEREST OR PLEASURE IN DOING THINGS: NOT AT ALL
2. FEELING DOWN, DEPRESSED OR HOPELESS: NOT AT ALL
SUM OF ALL RESPONSES TO PHQ9 QUESTIONS 1 & 2: 0

## 2024-03-01 NOTE — H&P
History Of Present Illness  Trenton Okeefe is a 53 y.o. male presenting with with a PMx of EtOH dependency, send hypertension, anxiety, BPH and hyperlipidemia presenting to [facility] for chief complaint of visual hallucinations, associated with worsening global tremor in the setting of decreased EtOH consumption x 1 day.  Patient admits to recently testing positive for COVID-19 on 2/26 and initiated on Paxlovid the day of positive test and continued up until Thursday (2/28).  Endorses visual hallucinations beginning the evening of 2/29 and continuing into Friday morning--the day of presentation.  Denies any chest pain, shortness of breath, cough, abdominal pain, nausea, vomiting, diarrhea, dysuria, hematuria, melena, hematochezia, seizures, generalized weakness, lower extremity swelling, heart palpitations, fevers or chills.  Denies a history of alcohol withdrawal but tells the longest stent between consuming alcohol is less than 1 day.  He describes visual hallucinations of seeing people and doorways intermittently since the onset yesterday evening.  His last drink was approximately 18 hours ago.      ED Course:  -Vitals: T afebrile, , RR 18, /111, 95% O2 RA  -Labs: CMP remarkable for mildly elevated serum glucose 121, serum sodium 139, potassium 3.7, chloride 106, bicarb 25, BUN 9, serum creatinine 0.58.  Troponin 5.  Serum ammonia 39.  CBC remarkable for leukopenia with a WBC of 3.2, hemoglobin 16.5, platelets 117.  VBG is negative for any kalemia/acidemia pH of 7.42.  Toxicology urine screen negative for acetaminophen, salicylates and alcohol.  -Imaging: CT head without IV contrast is grossly unremarkable noting mild sinusitis primarily with mild mucosal thickening of the right maxillary air cell.  With no significant interval change compared to prior exam (7/14/2023).  CXR No active cardiopulmonary disease  -Treatment Course: Patient brought in by EMS for complaint of altered mental status described as  visual hallucinations in the setting of alcohol withdrawal, initiated on CIWA protocol given one-time dose of Ativan with minimal effect followed by 10 mg of diazepam with effect, history significant for recent COVID-19 infection initiated on Paxlovid with decreased EtOH consumption over the past 4 days concern for alcohol withdrawal at this point Case discussed with teaching service who agreed to admit the patient for further management of alcohol withdrawal hallucinosis admitted to [facility] with telemetry.        PCP: Leon     CODE STATUS: FULL.     Past Medical History  Past Medical History:   Diagnosis Date    Cutaneous abscess of limb, unspecified 07/22/2019    Abscess of upper arm    Muscle spasm of back 11/18/2019    Back spasm    Personal history of diseases of the skin and subcutaneous tissue 08/18/2017    History of contact dermatitis    Personal history of other diseases of male genital organs 06/27/2017    History of testicular mass    Personal history of other diseases of the circulatory system 07/09/2021    History of hypertension    Plantar fascial fibromatosis 02/13/2017    Plantar fasciitis    Strain of muscle and tendon of unspecified wall of thorax, initial encounter 08/18/2015    Strain of thoracic region       Surgical History  Past Surgical History:   Procedure Laterality Date    ROTATOR CUFF REPAIR  05/02/2014    Rotator Cuff Repair    ROTATOR CUFF REPAIR  05/02/2014    Rotator Cuff Repair        Social History  He reports that he has never smoked. He has never used smokeless tobacco. He reports that he does not currently use alcohol. No history on file for drug use.    Family History  Family History   Problem Relation Name Age of Onset    Breast cancer Mother      Hip fracture Mother      Heart attack Father      Psoriasis Other Maternal Cousin         Allergies  Penicillins    Review of Systems   Constitutional:  Negative for fever and unexpected weight change.   HENT:  Negative for sinus  pressure and sore throat.    Eyes:  Negative for photophobia and visual disturbance.   Respiratory:  Negative for cough, choking, shortness of breath and wheezing.    Cardiovascular:  Negative for chest pain, palpitations and leg swelling.   Gastrointestinal:  Negative for abdominal pain, diarrhea, nausea and vomiting.   Endocrine: Negative for polydipsia, polyphagia and polyuria.   Genitourinary:  Negative for difficulty urinating, dysuria, frequency and urgency.   Musculoskeletal:  Negative for arthralgias, back pain and myalgias.   Skin:  Negative for color change.   Neurological:  Positive for tremors. Negative for dizziness, seizures, syncope, speech difficulty, weakness, light-headedness, numbness and headaches.   Psychiatric/Behavioral:  Positive for confusion and hallucinations. The patient is not hyperactive.         Physical Exam  Vitals and nursing note reviewed.   Constitutional:       General: He is not in acute distress.     Appearance: He is not toxic-appearing or diaphoretic.   HENT:      Head: Normocephalic and atraumatic.      Mouth/Throat:      Mouth: Mucous membranes are moist.      Pharynx: Oropharynx is clear.   Eyes:      General: No scleral icterus.     Pupils: Pupils are equal, round, and reactive to light.   Cardiovascular:      Rate and Rhythm: Regular rhythm. Tachycardia present.      Pulses: Normal pulses.      Heart sounds: Normal heart sounds. No murmur heard.     No gallop.   Pulmonary:      Effort: Pulmonary effort is normal. No respiratory distress.      Breath sounds: Normal breath sounds. No wheezing, rhonchi or rales.   Abdominal:      General: Abdomen is flat.      Palpations: Abdomen is soft.   Musculoskeletal:         General: Normal range of motion.      Cervical back: Normal range of motion and neck supple.      Right lower leg: No edema.      Left lower leg: No edema.   Skin:     General: Skin is warm and dry.      Capillary Refill: Capillary refill takes less than 2  "seconds.   Neurological:      General: No focal deficit present.      Mental Status: He is alert and oriented to person, place, and time.      Comments: Tremulous globally worse with intention   Psychiatric:         Mood and Affect: Mood normal.         Behavior: Behavior normal.          Last Recorded Vitals  Blood pressure (!) 152/93, pulse 82, temperature 36.6 °C (97.9 °F), resp. rate 20, height 1.778 m (5' 10\"), weight 74.8 kg (165 lb), SpO2 95 %.    Relevant Results  Scheduled medications  folic acid, 1 mg, oral, Daily  multivitamin with minerals, 1 tablet, oral, Daily  [START ON 3/4/2024] thiamine, 100 mg, oral, Daily  thiamine, 100 mg, intravenous, Daily      Continuous medications     PRN medications  PRN medications: diazePAM  Results for orders placed or performed during the hospital encounter of 03/01/24 (from the past 24 hour(s))   CBC and Auto Differential   Result Value Ref Range    WBC 3.2 (L) 4.4 - 11.3 x10*3/uL    nRBC 0.0 0.0 - 0.0 /100 WBCs    RBC 5.05 4.50 - 5.90 x10*6/uL    Hemoglobin 16.5 13.5 - 17.5 g/dL    Hematocrit 49.0 41.0 - 52.0 %    MCV 97 80 - 100 fL    MCH 32.7 26.0 - 34.0 pg    MCHC 33.7 32.0 - 36.0 g/dL    RDW 12.3 11.5 - 14.5 %    Platelets 117 (L) 150 - 450 x10*3/uL    Neutrophils % 60.7 40.0 - 80.0 %    Immature Granulocytes %, Automated 0.3 0.0 - 0.9 %    Lymphocytes % 21.4 13.0 - 44.0 %    Monocytes % 16.7 2.0 - 10.0 %    Eosinophils % 0.6 0.0 - 6.0 %    Basophils % 0.3 0.0 - 2.0 %    Neutrophils Absolute 1.93 1.20 - 7.70 x10*3/uL    Immature Granulocytes Absolute, Automated 0.01 0.00 - 0.70 x10*3/uL    Lymphocytes Absolute 0.68 (L) 1.20 - 4.80 x10*3/uL    Monocytes Absolute 0.53 0.10 - 1.00 x10*3/uL    Eosinophils Absolute 0.02 0.00 - 0.70 x10*3/uL    Basophils Absolute 0.01 0.00 - 0.10 x10*3/uL   Ammonia   Result Value Ref Range    Ammonia 39 16 - 53 umol/L   Blood Gas Venous Full Panel   Result Value Ref Range    POCT pH, Venous 7.42 7.33 - 7.43 pH    POCT pCO2, Venous 43 41 " - 51 mm Hg    POCT pO2, Venous 67 (H) 35 - 45 mm Hg    POCT SO2, Venous 94 (H) 45 - 75 %    POCT Oxy Hemoglobin, Venous 91.6 (H) 45.0 - 75.0 %    POCT Hematocrit Calculated, Venous 54.0 (H) 41.0 - 52.0 %    POCT Sodium, Venous 136 136 - 145 mmol/L    POCT Potassium, Venous 3.5 3.5 - 5.3 mmol/L    POCT Chloride, Venous 103 98 - 107 mmol/L    POCT Ionized Calicum, Venous 1.14 1.10 - 1.33 mmol/L    POCT Glucose, Venous 124 (H) 74 - 99 mg/dL    POCT Lactate, Venous 1.3 0.4 - 2.0 mmol/L    POCT Base Excess, Venous 2.8 -2.0 - 3.0 mmol/L    POCT HCO3 Calculated, Venous 27.9 (H) 22.0 - 26.0 mmol/L    POCT Hemoglobin, Venous 18.0 (H) 13.5 - 17.5 g/dL    POCT Anion Gap, Venous 9.0 (L) 10.0 - 25.0 mmol/L    Patient Temperature      FiO2 21 %   Troponin I, High Sensitivity   Result Value Ref Range    Troponin I, High Sensitivity 5 0 - 20 ng/L   Comprehensive metabolic panel   Result Value Ref Range    Glucose 121 (H) 74 - 99 mg/dL    Sodium 139 136 - 145 mmol/L    Potassium 3.7 3.5 - 5.3 mmol/L    Chloride 106 98 - 107 mmol/L    Bicarbonate 25 21 - 32 mmol/L    Anion Gap 12 10 - 20 mmol/L    Urea Nitrogen 9 6 - 23 mg/dL    Creatinine 0.58 0.50 - 1.30 mg/dL    eGFR >90 >60 mL/min/1.73m*2    Calcium 8.8 8.6 - 10.3 mg/dL    Albumin 4.2 3.4 - 5.0 g/dL    Alkaline Phosphatase 66 33 - 120 U/L    Total Protein 7.3 6.4 - 8.2 g/dL    AST 31 9 - 39 U/L    Bilirubin, Total 0.6 0.0 - 1.2 mg/dL    ALT 35 10 - 52 U/L   Acute Toxicology Panel, Blood   Result Value Ref Range    Acetaminophen <10.0 10.0 - 30.0 ug/mL    Salicylate  <3 4 - 20 mg/dL    Alcohol <10 <=10 mg/dL   ECG 12 lead   Result Value Ref Range    Ventricular Rate 74 BPM    Atrial Rate 74 BPM    DE Interval 146 ms    QRS Duration 102 ms    QT Interval 410 ms    QTC Calculation(Bazett) 455 ms    P Axis 76 degrees    R Axis 84 degrees    T Axis 18 degrees    QRS Count 12 beats    Q Onset 217 ms    P Onset 144 ms    P Offset 203 ms    T Offset 422 ms    QTC Fredericia 440 ms      ECG 12 lead    Result Date: 3/1/2024  Normal sinus rhythm with sinus arrhythmia Voltage criteria for left ventricular hypertrophy Nonspecific T wave abnormality Abnormal ECG No previous ECGs available    CT head wo IV contrast    Result Date: 3/1/2024  Interpreted By:  Guicho Wahl, STUDY: CT HEAD WO IV CONTRAST;  3/1/2024 8:59 am   INDICATION: Signs/Symptoms:hallucinations.   COMPARISON: 07/14/2023   ACCESSION NUMBER(S): [id number]   ORDERING CLINICIAN: JARRETT IQBAL   TECHNIQUE: Sequential trans axial images were obtained  .   FINDINGS: INTRACRANIAL:   CORTICAL SULCI AND EXTRA-AXIAL SPACES:  Unremarkable.   VENTRICULAR SYSTEM:  Unremarkable without significant dilatation.   CEREBRAL PARENCHYMA:  Unremarkable without significant degenerative change.There is no evidence of definite subacute infarction, intracranial hemorrhage or mass.   EXTRACRANIAL: Mild sinusitis, primarily with mild mucosal thickening of the right maxillary air cell. The calvarium is intact.       Unremarkable exam. There has not been significant interval change from the prior exam.   Signed by: Guicho Wahl 3/1/2024 9:12 AM Dictation workstation:   [id number]    XR chest 1 view    Result Date: 3/1/2024  Interpreted By:  Guicho Wahl, STUDY: XR CHEST 1 VIEW;  3/1/2024 8:50 am   INDICATION: Signs/Symptoms:hallucination.   COMPARISON: None.   ACCESSION NUMBER(S): [id number]   ORDERING CLINICIAN: JARRETT IQBAL   FINDINGS: CARDIOMEDIASTINAL SILHOUETTE AND VASCULATURE:   Cardiac size:  Within normal limits. Aortic shadow:  Within normal limits.   Mediastinal contours: Within normal limits.   Pulmonary vasculature:  The central vasculature is unremarkable   LUNGS: Lungs are clear.   ABDOMEN AND OTHER FINDINGS: No remarkable upper abdominal findings.   BONES: No acute osseous changes.       1.  No active cardiopulmonary disease.   Signed by: Guicho Wahl 3/1/2024 9:09 AM Dictation workstation:   [id number]       53-year-old male past medical  history significant for EtOH dependency/alcohol abuse presenting to [facility] with complaint of visual hallucinations over the past 24 hours accompanied by wife.  Patient is awake and able to provide history.  Pertinent recent history COVID-19 infection with main symptoms of sinus congestion but no endorsed respiratory distress e.g. cough, shortness of breath recently started Paxlovid 3 days prior to presenting to the ED.  Last alcoholic beverage was yesterday afternoon endorsed 4 shots of [place] whiskey noting patient regularly consumes 6-8 beers daily.  Concern for alcoholic hallucinosis versus alcohol withdrawal admitted to [facility] with CIWA protocol in place.     Assessment/Plan   Principal Problem:    Hallucination  Active Problems:    Anxiety    Depression    Hypertension    Prostatic enlargement    GERD (gastroesophageal reflux disease)    EtOH dependence (CMS/HCC)    History of COVID-19    Alcohol withdrawal hallucinosis (CMS/HCC)      #Visual hallucinations  #EtOH dependency with concern for alcohol withdrawal hallucinosis  -Physical exam remarkable for patient in minimal distress with tactile tremor of the distal extremities x 4, denying visual hallucinations at the time of exam.  Nondiaphoretic  -Given 1 mg Ativan in the ED followed by 10 mg diazepam IV  -Initiated on CIWA protocol with 10 mg IV diazepam for CIWA scores greater than 6 or heart rate greater than 100  -Ordered gabapentin 300 mg 3 times daily  -Seizure precautions  -Given oral folic acid, multivitamin and thiamine supplement in the ED  -Will continue folic acid, multivitamin, thiamine  -Social work on consult for EtOH counseling/resources  -Regular diet ordered  -Fall Precautions in place  -Telemetry      #Recent COVID-19 infection current treatment Paxlovid 2/26-present  #Isolated leukopenia without neutropenia likely in the setting of chronic EtOH dependency  #SIRS positive (heart rate, leukopenia) without concern of sepsis  -CXR negative for  any cardiopulmonary disease, CT head without IV contrast grossly unremarkable only mentioning mild sinusitis.  -Ordered COVID-19 screening and precautions as appropriate.  -Will discontinue Paxlovid in the setting of patient not having any acute respiratory symptoms  -Ordered peripheral blood smear  -Liver chemistries within normal limits  -Will trend leukopenia with a.m. CBC with differential      #Essential hypertension  #Anxiety/depression  #BPH  #GERD  -Will resume patient's home medications as appropriate once med reconciliation is complete    VTE prophylaxis: SCDs  Maintenance fluids: None  Diet: Regular  Consults: Social work for EtOH counseling/resources    CODE STATUS: Full    Dispo: Patient admitted for EtOH withdrawal hallucinosis on [facility] protocol requiring IV benzodiazepines likely requiring 2-3 midnights.    Assessment and plan discussed with attending physician     Hari Rocha, DO  Internal Medicine, PGY-2

## 2024-03-02 VITALS
WEIGHT: 165 LBS | BODY MASS INDEX: 23.62 KG/M2 | HEIGHT: 70 IN | RESPIRATION RATE: 16 BRPM | OXYGEN SATURATION: 96 % | TEMPERATURE: 98.2 F | DIASTOLIC BLOOD PRESSURE: 74 MMHG | HEART RATE: 68 BPM | SYSTOLIC BLOOD PRESSURE: 118 MMHG

## 2024-03-02 PROBLEM — Z86.16 HISTORY OF COVID-19: Status: RESOLVED | Noted: 2024-03-01 | Resolved: 2024-03-02

## 2024-03-02 PROBLEM — F41.9 ANXIETY: Status: RESOLVED | Noted: 2023-02-11 | Resolved: 2024-03-02

## 2024-03-02 PROBLEM — F10.932: Status: RESOLVED | Noted: 2024-03-01 | Resolved: 2024-03-02

## 2024-03-02 PROBLEM — F10.90 ALCOHOL USE DISORDER: Status: ACTIVE | Noted: 2024-03-01

## 2024-03-02 PROBLEM — F10.951: Status: ACTIVE | Noted: 2024-03-02

## 2024-03-02 PROBLEM — R44.3 HALLUCINATION: Status: RESOLVED | Noted: 2024-03-01 | Resolved: 2024-03-02

## 2024-03-02 LAB
ALBUMIN SERPL BCP-MCNC: 4 G/DL (ref 3.4–5)
ALP SERPL-CCNC: 60 U/L (ref 33–120)
ALT SERPL W P-5'-P-CCNC: 30 U/L (ref 10–52)
AMPHETAMINES UR QL SCN: ABNORMAL
ANION GAP SERPL CALC-SCNC: 10 MMOL/L (ref 10–20)
APPEARANCE UR: CLEAR
AST SERPL W P-5'-P-CCNC: 27 U/L (ref 9–39)
BARBITURATES UR QL SCN: ABNORMAL
BENZODIAZ UR QL SCN: ABNORMAL
BILIRUB SERPL-MCNC: 0.7 MG/DL (ref 0–1.2)
BILIRUB UR STRIP.AUTO-MCNC: NEGATIVE MG/DL
BUN SERPL-MCNC: 11 MG/DL (ref 6–23)
BZE UR QL SCN: ABNORMAL
CALCIUM SERPL-MCNC: 9.3 MG/DL (ref 8.6–10.3)
CANNABINOIDS UR QL SCN: ABNORMAL
CHLORIDE SERPL-SCNC: 103 MMOL/L (ref 98–107)
CO2 SERPL-SCNC: 29 MMOL/L (ref 21–32)
COLOR UR: YELLOW
CREAT SERPL-MCNC: 0.64 MG/DL (ref 0.5–1.3)
EGFRCR SERPLBLD CKD-EPI 2021: >90 ML/MIN/1.73M*2
FENTANYL+NORFENTANYL UR QL SCN: ABNORMAL
GLUCOSE SERPL-MCNC: 111 MG/DL (ref 74–99)
GLUCOSE UR STRIP.AUTO-MCNC: NEGATIVE MG/DL
HOLD SPECIMEN: NORMAL
HOLD SPECIMEN: NORMAL
KETONES UR STRIP.AUTO-MCNC: NEGATIVE MG/DL
LEUKOCYTE ESTERASE UR QL STRIP.AUTO: NEGATIVE
MAGNESIUM SERPL-MCNC: 1.8 MG/DL (ref 1.6–2.4)
NITRITE UR QL STRIP.AUTO: NEGATIVE
OPIATES UR QL SCN: ABNORMAL
OXYCODONE+OXYMORPHONE UR QL SCN: ABNORMAL
PCP UR QL SCN: ABNORMAL
PH UR STRIP.AUTO: 6 [PH]
POTASSIUM SERPL-SCNC: 3.6 MMOL/L (ref 3.5–5.3)
PROT SERPL-MCNC: 7 G/DL (ref 6.4–8.2)
PROT UR STRIP.AUTO-MCNC: NEGATIVE MG/DL
RBC # UR STRIP.AUTO: NEGATIVE /UL
SODIUM SERPL-SCNC: 138 MMOL/L (ref 136–145)
SP GR UR STRIP.AUTO: 1.01
UROBILINOGEN UR STRIP.AUTO-MCNC: <2 MG/DL

## 2024-03-02 PROCEDURE — 80307 DRUG TEST PRSMV CHEM ANLYZR: CPT

## 2024-03-02 PROCEDURE — 80053 COMPREHEN METABOLIC PANEL: CPT

## 2024-03-02 PROCEDURE — G0378 HOSPITAL OBSERVATION PER HR: HCPCS

## 2024-03-02 PROCEDURE — 83735 ASSAY OF MAGNESIUM: CPT

## 2024-03-02 PROCEDURE — 2500000001 HC RX 250 WO HCPCS SELF ADMINISTERED DRUGS (ALT 637 FOR MEDICARE OP)

## 2024-03-02 PROCEDURE — 99238 HOSP IP/OBS DSCHRG MGMT 30/<: CPT

## 2024-03-02 PROCEDURE — 80346 BENZODIAZEPINES1-12: CPT | Mod: STJLAB

## 2024-03-02 PROCEDURE — 81003 URINALYSIS AUTO W/O SCOPE: CPT

## 2024-03-02 PROCEDURE — 2500000002 HC RX 250 W HCPCS SELF ADMINISTERED DRUGS (ALT 637 FOR MEDICARE OP, ALT 636 FOR OP/ED)

## 2024-03-02 PROCEDURE — 36415 COLL VENOUS BLD VENIPUNCTURE: CPT

## 2024-03-02 RX ORDER — TRAZODONE HYDROCHLORIDE 100 MG/1
100 TABLET ORAL NIGHTLY
Status: DISCONTINUED | OUTPATIENT
Start: 2024-03-02 | End: 2024-03-02 | Stop reason: HOSPADM

## 2024-03-02 RX ORDER — FOLIC ACID 1 MG/1
1 TABLET ORAL DAILY
Qty: 30 TABLET | Refills: 0 | Status: SHIPPED | OUTPATIENT
Start: 2024-03-03 | End: 2024-03-24 | Stop reason: SDUPTHER

## 2024-03-02 RX ORDER — LANOLIN ALCOHOL/MO/W.PET/CERES
100 CREAM (GRAM) TOPICAL DAILY
Qty: 30 TABLET | Refills: 0 | Status: SHIPPED | OUTPATIENT
Start: 2024-03-04 | End: 2024-03-24 | Stop reason: SDUPTHER

## 2024-03-02 RX ORDER — DIAZEPAM 5 MG/ML
10 INJECTION, SOLUTION INTRAMUSCULAR; INTRAVENOUS EVERY 6 HOURS PRN
Status: DISCONTINUED | OUTPATIENT
Start: 2024-03-02 | End: 2024-03-02 | Stop reason: HOSPADM

## 2024-03-02 RX ORDER — ATORVASTATIN CALCIUM 10 MG/1
10 TABLET, FILM COATED ORAL NIGHTLY
Status: DISCONTINUED | OUTPATIENT
Start: 2024-03-02 | End: 2024-03-02 | Stop reason: HOSPADM

## 2024-03-02 RX ORDER — SERTRALINE HYDROCHLORIDE 50 MG/1
50 TABLET, FILM COATED ORAL DAILY
Status: DISCONTINUED | OUTPATIENT
Start: 2024-03-02 | End: 2024-03-02 | Stop reason: HOSPADM

## 2024-03-02 RX ORDER — LISINOPRIL 10 MG/1
10 TABLET ORAL DAILY
Status: DISCONTINUED | OUTPATIENT
Start: 2024-03-02 | End: 2024-03-02 | Stop reason: HOSPADM

## 2024-03-02 RX ORDER — GABAPENTIN 300 MG/1
300 CAPSULE ORAL 3 TIMES DAILY
Qty: 12 CAPSULE | Refills: 0 | Status: SHIPPED | OUTPATIENT
Start: 2024-03-02 | End: 2024-03-08 | Stop reason: ALTCHOICE

## 2024-03-02 RX ADMIN — LISINOPRIL 10 MG: 10 TABLET ORAL at 08:08

## 2024-03-02 RX ADMIN — SERTRALINE HYDROCHLORIDE 50 MG: 50 TABLET ORAL at 08:08

## 2024-03-02 RX ADMIN — GABAPENTIN 300 MG: 300 CAPSULE ORAL at 08:08

## 2024-03-02 RX ADMIN — Medication 1 TABLET: at 08:08

## 2024-03-02 RX ADMIN — FOLIC ACID 1 MG: 1 TABLET ORAL at 08:08

## 2024-03-02 ASSESSMENT — PAIN - FUNCTIONAL ASSESSMENT
PAIN_FUNCTIONAL_ASSESSMENT: 0-10

## 2024-03-02 ASSESSMENT — LIFESTYLE VARIABLES
ANXIETY: NO ANXIETY, AT EASE
ORIENTATION AND CLOUDING OF SENSORIUM: ORIENTED AND CAN DO SERIAL ADDITIONS
TREMOR: NO TREMOR
TREMOR: 2
ANXIETY: NO ANXIETY, AT EASE
HEADACHE, FULLNESS IN HEAD: NOT PRESENT
BLOOD PRESSURE: 142/82
TOTAL SCORE: 1
TREMOR: 2
HEADACHE, FULLNESS IN HEAD: NOT PRESENT
ORIENTATION AND CLOUDING OF SENSORIUM: ORIENTED AND CAN DO SERIAL ADDITIONS
NAUSEA AND VOMITING: NO NAUSEA AND NO VOMITING
AUDITORY DISTURBANCES: NOT PRESENT
VISUAL DISTURBANCES: NOT PRESENT
TREMOR: 3
AGITATION: NORMAL ACTIVITY
PAROXYSMAL SWEATS: BARELY PERCEPTIBLE SWEATING, PALMS MOIST
AUDITORY DISTURBANCES: NOT PRESENT
PAROXYSMAL SWEATS: NO SWEAT VISIBLE
ORIENTATION AND CLOUDING OF SENSORIUM: ORIENTED AND CAN DO SERIAL ADDITIONS
TOTAL SCORE: 1
VISUAL DISTURBANCES: NOT PRESENT
HEADACHE, FULLNESS IN HEAD: NOT PRESENT
AGITATION: NORMAL ACTIVITY
TOTAL SCORE: 3
TREMOR: NOT VISIBLE, BUT CAN BE FELT FINGERTIP TO FINGERTIP
AGITATION: NORMAL ACTIVITY
NAUSEA AND VOMITING: NO NAUSEA AND NO VOMITING
AUDITORY DISTURBANCES: NOT PRESENT
PAROXYSMAL SWEATS: NO SWEAT VISIBLE
AUDITORY DISTURBANCES: NOT PRESENT
ANXIETY: NO ANXIETY, AT EASE
TREMOR: NOT VISIBLE, BUT CAN BE FELT FINGERTIP TO FINGERTIP
HEADACHE, FULLNESS IN HEAD: NOT PRESENT
NAUSEA AND VOMITING: NO NAUSEA AND NO VOMITING
ORIENTATION AND CLOUDING OF SENSORIUM: ORIENTED AND CAN DO SERIAL ADDITIONS
VISUAL DISTURBANCES: NOT PRESENT
HEADACHE, FULLNESS IN HEAD: NOT PRESENT
PULSE: 57
VISUAL DISTURBANCES: NOT PRESENT
AGITATION: NORMAL ACTIVITY
AGITATION: NORMAL ACTIVITY
NAUSEA AND VOMITING: NO NAUSEA AND NO VOMITING
AGITATION: NORMAL ACTIVITY
AUDITORY DISTURBANCES: NOT PRESENT
ANXIETY: NO ANXIETY, AT EASE
VISUAL DISTURBANCES: NOT PRESENT
HEADACHE, FULLNESS IN HEAD: NOT PRESENT
ANXIETY: NO ANXIETY, AT EASE
ORIENTATION AND CLOUDING OF SENSORIUM: ORIENTED AND CAN DO SERIAL ADDITIONS
VISUAL DISTURBANCES: NOT PRESENT
PAROXYSMAL SWEATS: NO SWEAT VISIBLE
TOTAL SCORE: 0
ORIENTATION AND CLOUDING OF SENSORIUM: ORIENTED AND CAN DO SERIAL ADDITIONS
AUDITORY DISTURBANCES: NOT PRESENT
ANXIETY: NO ANXIETY, AT EASE
NAUSEA AND VOMITING: NO NAUSEA AND NO VOMITING
PAROXYSMAL SWEATS: NO SWEAT VISIBLE
TOTAL SCORE: 4
PAROXYSMAL SWEATS: BARELY PERCEPTIBLE SWEATING, PALMS MOIST
NAUSEA AND VOMITING: NO NAUSEA AND NO VOMITING
BLOOD PRESSURE: 162/95
TOTAL SCORE: 2

## 2024-03-02 ASSESSMENT — PAIN SCALES - GENERAL
PAINLEVEL_OUTOF10: 0 - NO PAIN

## 2024-03-02 NOTE — DISCHARGE SUMMARY
Discharge Diagnosis  Hallucination    Issues Requiring Follow-Up  Alcohol withdrawal hallucinosis  Alcohol dependency  Concern for sleep apnea      Discharge Meds     Your medication list        ASK your doctor about these medications        Instructions Last Dose Given Next Dose Due   atorvastatin 10 mg tablet  Commonly known as: Lipitor      Take 1 tablet (10 mg) by mouth once daily at bedtime.       lisinopril 10 mg tablet      Take 1 tablet (10 mg) by mouth once daily.       pantoprazole 40 mg EC tablet  Commonly known as: ProtoNix      Take 1 tablet (40 mg) by mouth once daily in the morning. Take before meals.       sertraline 50 mg tablet  Commonly known as: Zoloft      Take 1 tablet (50 mg) by mouth once daily.       tadalafil 20 mg tablet  Commonly known as: Cialis      take 1 tablet by mouth 1 hour before activity as needed       traZODone 100 mg tablet  Commonly known as: Desyrel      Take 1 tablet (100 mg) by mouth once daily at bedtime.                Test Results Pending At Discharge  Pending Labs       Order Current Status    Benzodiazepine Confirmation, Urine In process    Extra Urine Gray Tube In process    OOB Internal Tracking In process    Pathologist Review-CBC Differential In process    Urinalysis with Reflex Culture and Microscopic In process            Hospital Course  53-year-old male past medical history significant for EtOH dependency/alcohol abuse presenting to [facility] with complaint of visual hallucinations over the past several hours accompanied by wife.   Pertinent recent history COVID-19 infection with main symptoms of sinus congestion but no endorsed respiratory distress e.g. cough, shortness of breath.  Recently started Paxlovid 3 days prior to presenting to the ED.  Last alcoholic beverage was approximately 18 hours ago endorsing 4 shots of [place] whiskey which was a drastic decrease in his typical daily alcohol consumption which ranges from 8-12 beers daily, concern for  alcoholic withdrawal hallucinosis with elevated CIWA scores greater than 9 admitted to [facility] with CIWA protocol in place.  Patient initiated on 30 mg oral gabapentin 3 times daily, given 2 doses of diazepam over his hospital stay noting his last dose was greater than 12 hours prior to discharge.  Noting over his hospital stay patient was alert and oriented x 4 no reported seizure-like activity remained hemodynamically stable and on the following morning from admission patient stable for discharge home with prescription for folic acid, thiamine and gabapentin instructed to follow-up with his primary care provider noting he has a scheduled appointment this month encouraged to keep that appointment.  Provided resources for sobriety in the setting of patient's known EtOH dependency.  Lastly overnight patient became mildly hypoxic requiring 2 L nasal cannula oxygen supplementation to maintain SpO2 greater than 90% concern for sleep apnea noting patient does endorse history of snoring and not having sleep study he was provided with an outgoing referral to adult sleep medicine.  Discussed plan with patient on discharge home with instructions to follow-up with his primary care provider and sleep medicine.  Outgoing medications discussed patient understanding agreeable with current plan of discharge.    Pertinent Physical Exam At Time of Discharge  Physical Exam  Vitals and nursing note reviewed.   Constitutional:       General: He is not in acute distress.     Appearance: He is normal weight. He is not ill-appearing, toxic-appearing or diaphoretic.   HENT:      Head: Normocephalic and atraumatic.   Eyes:      General: No scleral icterus.     Conjunctiva/sclera: Conjunctivae normal.      Pupils: Pupils are equal, round, and reactive to light.   Cardiovascular:      Rate and Rhythm: Normal rate and regular rhythm.      Pulses: Normal pulses.      Heart sounds: Normal heart sounds.   Pulmonary:      Effort: Pulmonary effort is  normal.      Breath sounds: Normal breath sounds.   Abdominal:      General: Abdomen is flat.      Palpations: Abdomen is soft.   Musculoskeletal:         General: Normal range of motion.      Cervical back: Normal range of motion and neck supple.      Right lower leg: No edema.      Left lower leg: No edema.   Skin:     General: Skin is warm and dry.      Capillary Refill: Capillary refill takes less than 2 seconds.   Neurological:      General: No focal deficit present.      Mental Status: He is alert and oriented to person, place, and time.   Psychiatric:         Mood and Affect: Mood normal.         Behavior: Behavior normal.         Thought Content: Thought content normal.         Outpatient Follow-Up  No future appointments.        Assessment and plan discussed with attending physician     Hari Rocha, DO  Internal Medicine, PGY-2

## 2024-03-02 NOTE — CARE PLAN
Problem: Fall/Injury  Goal: Not fall by end of shift  Outcome: Progressing     Problem: Seizure  Goal: I will remain free from seizures  Outcome: Progressing   The patient's goals for the shift include Get some rest    The clinical goals for the shift include Patient will remain seizure free

## 2024-03-02 NOTE — DISCHARGE INSTRUCTIONS
Please call and follow up with your primary care provider, Dr. Benjamin Quintero, and schedule a follow up appointment in 2-3 days or if you have a scheduled appointment in the next month keep that appointment.  You are admitted to the hospital for alcoholic withdrawal hallucinosis in the setting of alcohol dependency and upon leaving the hospital is recommended that you seek out/attend AA meetings to better improve you chance of providing maintenance.  Discussions with your primary care doctor could also be of benefit and that there are medications that can aid in the cravings of alcohol and these discussions could be head and you are scheduled hospital follow-up visit with Dr. Quintero.  You will be leaving the hospital with a prescription for gabapentin 3 mg oral tablet to be taken 3 times daily for the next 4 days.  Also provided will be a referral for a sleep study concern for obstructive sleep apnea.      Please take all of your medications as directed.     New medications: Prescription was sent to your pharmacy on file [facility] pharmacy in [facility]    1.  Folic acid (Folvite) 1 mg oral tablet-take 1 tablet once daily for the next 30 days  2.  Thiamine (vitamin B1) 100 mg oral tablet-take 1 tablet once daily for the next 30 days  3.  Gabapentin (Neurontin) 200 mg oral capsule-take 1 tablet 3 times daily for the next 4 days     Discontinued medications:    N/A     If you have any concerning symptoms, or worsening symptoms please call or return, such as chest pain, shortness of breath, new onset confusion, loss of sensation, or fever >103F.     Thank you for allowing us to participate in your health care.     -[facility]

## 2024-03-02 NOTE — CARE PLAN
0812 Patient took medications without incident. Peripheral IV was lost this am, will give thiamine once access re-established. Dr. Rocha aware.   0939 Notified Dr. Rocha patient does not have DVT prophy orders.   1040 Patient wife is going home to get clothes for patient in order to discharge.   1130 Messaged Frieda Lee  re: social work consult for alcohol dependence.   1202 No response at this time from . Per Dr. Rocha if they do not come within a couple hours it is still ok for patient do discharge. This RN is providing multiple materials for resources to attend AA.    The patient's goals for the shift include Get some rest      Problem: Fall/Injury  Goal: Not fall by end of shift  3/2/2024 1247 by Monika King RN  Outcome: Met  3/2/2024 0943 by Monika Knig RN  Outcome: Progressing  Goal: Be free from injury by end of the shift  3/2/2024 1247 by Monika King RN  Outcome: Met  3/2/2024 0943 by Monika King RN  Outcome: Progressing     Problem: Seizure  Goal: I will remain free from seizures  3/2/2024 1247 by Monika King RN  Outcome: Met  3/2/2024 0943 by Monika King RN  Outcome: Progressing     Problem: Pain  Goal: My pain/discomfort is manageable  3/2/2024 1247 by Monika King RN  Outcome: Met  3/2/2024 0943 by Monika King RN  Outcome: Progressing     Problem: Psychosocial Needs  Goal: Demonstrates ability to cope with hospitalization/illness  3/2/2024 1247 by Monika King RN  Outcome: Met  3/2/2024 0943 by Monika King RN  Outcome: Progressing  Goal: Collaborate with me, my family, and caregiver to identify my specific goals  3/2/2024 1247 by Monika King RN  Outcome: Met  3/2/2024 0943 by Monika King RN  Outcome: Progressing       The clinical goals for the shift include Patient will be absent of hallucinations throughout shift by end of shift 03/02/2024 @ 1700.    All goals were met or adequate for discharge.

## 2024-03-02 NOTE — CARE PLAN
The patient's goals for the shift include Get some rest    The clinical goals for the shift include Patient will remain seizure free

## 2024-03-04 LAB — PATH REVIEW-CBC DIFFERENTIAL: NORMAL

## 2024-03-05 LAB
ATRIAL RATE: 74 BPM
P AXIS: 76 DEGREES
P OFFSET: 203 MS
P ONSET: 144 MS
PR INTERVAL: 146 MS
Q ONSET: 217 MS
QRS COUNT: 12 BEATS
QRS DURATION: 102 MS
QT INTERVAL: 410 MS
QTC CALCULATION(BAZETT): 455 MS
QTC FREDERICIA: 440 MS
R AXIS: 84 DEGREES
T AXIS: 18 DEGREES
T OFFSET: 422 MS
VENTRICULAR RATE: 74 BPM

## 2024-03-06 ENCOUNTER — PATIENT OUTREACH (OUTPATIENT)
Dept: CARE COORDINATION | Facility: CLINIC | Age: 54
End: 2024-03-06
Payer: COMMERCIAL

## 2024-03-06 NOTE — PROGRESS NOTES
Referral received for care management services.  Outreach call to introduce self, available services, and assess needs.  Voicemail message left with my contact information.   CAM Gaona  O    Contact: [phone]

## 2024-03-08 ENCOUNTER — OFFICE VISIT (OUTPATIENT)
Dept: PRIMARY CARE | Facility: CLINIC | Age: 54
End: 2024-03-08
Payer: COMMERCIAL

## 2024-03-08 VITALS
DIASTOLIC BLOOD PRESSURE: 80 MMHG | TEMPERATURE: 97.9 F | BODY MASS INDEX: 24.04 KG/M2 | WEIGHT: 167.55 LBS | SYSTOLIC BLOOD PRESSURE: 120 MMHG

## 2024-03-08 DIAGNOSIS — F10.951: Primary | ICD-10-CM

## 2024-03-08 DIAGNOSIS — N52.9 MALE ERECTILE DISORDER: ICD-10-CM

## 2024-03-08 DIAGNOSIS — F10.931 ALCOHOL WITHDRAWAL SYNDROME, WITH DELIRIUM (MULTI): ICD-10-CM

## 2024-03-08 DIAGNOSIS — F10.90 ALCOHOL USE DISORDER: ICD-10-CM

## 2024-03-08 DIAGNOSIS — G62.9 PERIPHERAL POLYNEUROPATHY: ICD-10-CM

## 2024-03-08 LAB
1OH-MIDAZOLAM UR CFM-MCNC: <25 NG/ML
7AMINOCLONAZEPAM UR CFM-MCNC: <25 NG/ML
A-OH ALPRAZ UR CFM-MCNC: <25 NG/ML
ALPRAZ UR CFM-MCNC: <25 NG/ML
CHLORDIAZEP UR CFM-MCNC: <25 NG/ML
CLONAZEPAM UR CFM-MCNC: <25 NG/ML
DIAZEPAM UR CFM-MCNC: <25 NG/ML
LORAZEPAM UR CFM-MCNC: 611 NG/ML
MIDAZOLAM UR CFM-MCNC: <25 NG/ML
NORDIAZEPAM UR CFM-MCNC: 559 NG/ML
OXAZEPAM UR CFM-MCNC: 119 NG/ML
TEMAZEPAM UR CFM-MCNC: 354 NG/ML

## 2024-03-08 PROCEDURE — 99495 TRANSJ CARE MGMT MOD F2F 14D: CPT | Performed by: FAMILY MEDICINE

## 2024-03-08 PROCEDURE — 3074F SYST BP LT 130 MM HG: CPT | Performed by: FAMILY MEDICINE

## 2024-03-08 PROCEDURE — 3079F DIAST BP 80-89 MM HG: CPT | Performed by: FAMILY MEDICINE

## 2024-03-08 PROCEDURE — 1036F TOBACCO NON-USER: CPT | Performed by: FAMILY MEDICINE

## 2024-03-08 RX ORDER — DULOXETIN HYDROCHLORIDE 60 MG/1
60 CAPSULE, DELAYED RELEASE ORAL DAILY
Qty: 30 CAPSULE | Refills: 3 | Status: SHIPPED | OUTPATIENT
Start: 2024-03-08 | End: 2024-09-04

## 2024-03-08 RX ORDER — TADALAFIL 20 MG/1
TABLET ORAL
Qty: 18 TABLET | Refills: 1 | Status: SHIPPED | OUTPATIENT
Start: 2024-03-08

## 2024-03-08 ASSESSMENT — ENCOUNTER SYMPTOMS
SHORTNESS OF BREATH: 0
WEAKNESS: 0
DEPRESSION: 0
SEIZURES: 0
DIZZINESS: 0
VOMITING: 0
ABDOMINAL PAIN: 0
FATIGUE: 0
TREMORS: 0
CONSTIPATION: 0
DYSPHORIC MOOD: 0
COUGH: 0
FEVER: 0
DIARRHEA: 0
NUMBNESS: 1
NAUSEA: 0
HALLUCINATIONS: 0
AGITATION: 0

## 2024-03-08 ASSESSMENT — PATIENT HEALTH QUESTIONNAIRE - PHQ9
1. LITTLE INTEREST OR PLEASURE IN DOING THINGS: NOT AT ALL
2. FEELING DOWN, DEPRESSED OR HOPELESS: NOT AT ALL
SUM OF ALL RESPONSES TO PHQ9 QUESTIONS 1 AND 2: 0

## 2024-03-08 NOTE — PROGRESS NOTES
Subjective   Patient ID: [id number]     Trenton Okeefe is a 53 y.o. male who presents for Hospital Follow-up.  HPI  He was admitted from 3/1/24 to 3/2/24.    DC summary from 3/2/24 was reviewed today.    He was admitted for alcohol withdrawal with hallucinations, alcoholism.  He had visual hallucinations.  He had had covid with sinus related symptoms but no shortness of breath.    He had been on paxlovid for three days prior to presenting in the ER.  He had stopped drinking 18 hours prior to ER arrival after drinking four shots of whiskey, which was drastically less than his typical 8 to 12 drinks per day.      He was admitted and placed in [facility] protocol. Was treated with gabapentin, diazepam and IV fluids.     He was given folic acid, thiamine and gabapentin and disharged with instructions to follow up here.          He was found to have concerns for sleep apnea as well.      He was discharged on atorvastatin, lisinopril, pantoprazole, sertraline, tadalafil, trazodone, thiamine, folic acid, gabapentin,     He was only to be on gabapentin for 3 days.        Current Outpatient Medications on File Prior to Visit   Medication Sig Dispense Refill    atorvastatin (Lipitor) 10 mg tablet Take 1 tablet (10 mg) by mouth once daily at bedtime. 90 tablet 1    folic acid (Folvite) 1 mg tablet Take 1 tablet (1 mg) by mouth once daily. Do not start before March 3, 2024. 30 tablet 0    lisinopril 10 mg tablet Take 1 tablet (10 mg) by mouth once daily. 90 tablet 1    sertraline (Zoloft) 50 mg tablet Take 1 tablet (50 mg) by mouth once daily. 90 tablet 1    thiamine (Vitamin B-1) 100 mg tablet Take 1 tablet (100 mg) by mouth once daily. Do not start before March 4, 2024. 30 tablet 0    traZODone (Desyrel) 100 mg tablet Take 1 tablet (100 mg) by mouth once daily at bedtime. 90 tablet 1    [DISCONTINUED] gabapentin (Neurontin) 300 mg capsule Take 1 capsule (300 mg) by mouth 3 times a day for 4 days. 12 capsule 0    [DISCONTINUED]  pantoprazole (ProtoNix) 40 mg EC tablet Take 1 tablet (40 mg) by mouth once daily in the morning. Take before meals. (Patient not taking: Reported on 3/1/2024) 90 tablet 1    [DISCONTINUED] tadalafil 20 mg tablet take 1 tablet by mouth 1 hour before activity as needed 18 tablet 1     No current facility-administered medications on file prior to visit.     Tobacco Use: Low Risk  (3/8/2024)    Patient History     Smoking Tobacco Use: Never     Smokeless Tobacco Use: Never     Passive Exposure: Not on file     Since going home, no alcohol at all.  He went to  last week and he plans to continue to attend.     He thinks he is doing well with stopping drinking.     He plans to return in a few months.    No seizures, tremors, hallucinations, nervousness or anxiety since going home.    /80.      He has numbness in the feet at the arches.      He tested negative for diabetes.    He has tingling in the feet.  It bothers him because he is on his feet all day.    Review of Systems   Constitutional:  Negative for fatigue and fever.   Respiratory:  Negative for cough and shortness of breath.    Cardiovascular:  Negative for chest pain.   Gastrointestinal:  Negative for abdominal pain, constipation, diarrhea, nausea and vomiting.   Neurological:  Positive for numbness. Negative for dizziness, tremors, seizures, syncope and weakness.   Psychiatric/Behavioral:  Negative for agitation, dysphoric mood and hallucinations (none since the hospital stay.  tx for withdrawal from alcohol.).                Objective     /80 (BP Location: Right arm, Patient Position: Sitting)   Temp 36.6 °C (97.9 °F) (Skin)   Wt 76 kg (167 lb 8.8 oz)   BMI 24.04 kg/m²      Physical Exam  Constitutional:       Appearance: Normal appearance.   Cardiovascular:      Rate and Rhythm: Normal rate and regular rhythm.      Pulses: Normal pulses.      Heart sounds: Normal heart sounds. No murmur heard.  Pulmonary:      Effort: Pulmonary effort is  normal. No respiratory distress.      Breath sounds: Normal breath sounds.   Musculoskeletal:         General: No tenderness.      Cervical back: Normal range of motion and neck supple. No rigidity.      Right lower leg: No edema.      Left lower leg: No edema.   Lymphadenopathy:      Cervical: No cervical adenopathy.   Neurological:      General: No focal deficit present.      Mental Status: He is alert and oriented to person, place, and time.   Psychiatric:         Mood and Affect: Mood normal.         Behavior: Behavior normal.         Thought Content: Thought content normal.         Assessment/Plan   Problem List Items Addressed This Visit       Male erectile disorder    Relevant Medications    tadalafil 20 mg tablet    Alcohol use disorder    Acute alcoholic hallucinosis (CMS/HCC) - Primary     Other Visit Diagnoses       Alcohol withdrawal syndrome, with delirium (CMS/HCC)        Peripheral polyneuropathy        Relevant Medications    DULoxetine (Cymbalta) 60 mg DR capsule          I started duloxetine for the tingling and nerve damage in your feet.  You seem to be doing well following the hospital stay.  Continue with AA.  Continue to avoid alcohol.  Return in four months to see Dr Quintero.  Return in one or two months if you have any problems.     Que Arceo, DO

## 2024-03-08 NOTE — PATIENT INSTRUCTIONS
I started duloxetine for the tingling and nerve damage in your feet.  You seem to be doing well following the hospital stay.  Continue with AA.  Continue to avoid alcohol.  Return in four months to see Dr Quintero.  Return in one or two months if you have any problems.

## 2024-03-21 ENCOUNTER — PATIENT OUTREACH (OUTPATIENT)
Dept: CARE COORDINATION | Facility: CLINIC | Age: 54
End: 2024-03-21
Payer: COMMERCIAL

## 2024-03-21 NOTE — PROGRESS NOTES
Referral received for care management services.  Outreach call to introduce self, available services, and assess needs.  Voicemail message left with my contact information.  Final outreach attempt.   CAM Gaona  [facility]    Contact: [phone]

## 2024-03-24 DIAGNOSIS — F10.932: Primary | ICD-10-CM

## 2024-03-24 RX ORDER — LANOLIN ALCOHOL/MO/W.PET/CERES
CREAM (GRAM) TOPICAL
Qty: 90 TABLET | Refills: 1 | Status: SHIPPED | OUTPATIENT
Start: 2024-03-24

## 2024-03-24 RX ORDER — FOLIC ACID 1 MG/1
1 TABLET ORAL DAILY
Qty: 30 TABLET | Refills: 0 | Status: SHIPPED | OUTPATIENT
Start: 2024-03-24 | End: 2024-04-23

## 2024-04-20 DIAGNOSIS — F10.932: ICD-10-CM

## 2024-04-25 RX ORDER — FOLIC ACID 1 MG/1
1 TABLET ORAL DAILY
Qty: 90 TABLET | Refills: 1 | OUTPATIENT
Start: 2024-04-25 | End: 2024-05-25

## 2024-07-09 ENCOUNTER — APPOINTMENT (OUTPATIENT)
Dept: PRIMARY CARE | Facility: CLINIC | Age: 54
End: 2024-07-09
Payer: COMMERCIAL

## 2024-09-23 ENCOUNTER — TELEPHONE (OUTPATIENT)
Dept: PRIMARY CARE | Facility: CLINIC | Age: 54
End: 2024-09-23
Payer: COMMERCIAL

## 2024-09-23 DIAGNOSIS — N52.9 MALE ERECTILE DISORDER: ICD-10-CM

## 2024-09-23 DIAGNOSIS — G62.9 PERIPHERAL POLYNEUROPATHY: ICD-10-CM

## 2024-09-23 DIAGNOSIS — I10 HYPERTENSION, UNSPECIFIED TYPE: ICD-10-CM

## 2024-09-23 DIAGNOSIS — F32.A DEPRESSION, UNSPECIFIED DEPRESSION TYPE: ICD-10-CM

## 2024-09-23 DIAGNOSIS — E78.5 HYPERLIPIDEMIA, UNSPECIFIED HYPERLIPIDEMIA TYPE: ICD-10-CM

## 2024-09-23 RX ORDER — SERTRALINE HYDROCHLORIDE 50 MG/1
50 TABLET, FILM COATED ORAL DAILY
Qty: 30 TABLET | Refills: 0 | Status: SHIPPED | OUTPATIENT
Start: 2024-09-23 | End: 2025-03-22

## 2024-09-23 RX ORDER — TRAZODONE HYDROCHLORIDE 100 MG/1
100 TABLET ORAL NIGHTLY
Qty: 30 TABLET | Refills: 0 | Status: SHIPPED | OUTPATIENT
Start: 2024-09-23 | End: 2025-03-22

## 2024-09-23 RX ORDER — ATORVASTATIN CALCIUM 10 MG/1
10 TABLET, FILM COATED ORAL NIGHTLY
Qty: 30 TABLET | Refills: 0 | Status: SHIPPED | OUTPATIENT
Start: 2024-09-23 | End: 2025-03-22

## 2024-09-23 RX ORDER — LISINOPRIL 10 MG/1
10 TABLET ORAL DAILY
Qty: 30 TABLET | Refills: 0 | Status: SHIPPED | OUTPATIENT
Start: 2024-09-23 | End: 2025-03-22

## 2024-09-23 RX ORDER — TADALAFIL 20 MG/1
TABLET ORAL
Qty: 9 TABLET | Refills: 0 | Status: SHIPPED | OUTPATIENT
Start: 2024-09-23

## 2024-09-23 RX ORDER — DULOXETIN HYDROCHLORIDE 60 MG/1
60 CAPSULE, DELAYED RELEASE ORAL DAILY
Qty: 30 CAPSULE | Refills: 0 | Status: SHIPPED | OUTPATIENT
Start: 2024-09-23 | End: 2025-03-22

## 2024-09-23 NOTE — TELEPHONE ENCOUNTER
DANNY PATIENT - NEEDS 30 DAYS -  IS COVERING ALL DAY    REFILL REQUEST    Patient states he doesn't know which meds need filled and wants them all filled. Patient stated he threw all of his bottles away and doesn't know the names of the meds that need filled. There are six meds that have been filled here, which are:    1) Tadalafil (20 mg - one tab as needed)  2) Duloxetine (60 mg - one cap daily)  3) Atorvastatin (10 mg - one tab daily)  4) Lisinopril (10 mg - one tab daily)  5) Sertraline (50 mg - one tab daily)  6) Trazodone (100 mg - one tab daily)    Pharmacy: [facility]  Pharmacy Address: [id number] [facility] in [place]    LR: 12/12/2023 and 03/08/2024  LV: 03/08/2024  NV: 10/30/2024

## 2024-10-21 DIAGNOSIS — G62.9 PERIPHERAL POLYNEUROPATHY: ICD-10-CM

## 2024-10-21 DIAGNOSIS — E78.5 HYPERLIPIDEMIA, UNSPECIFIED HYPERLIPIDEMIA TYPE: ICD-10-CM

## 2024-10-21 RX ORDER — ATORVASTATIN CALCIUM 10 MG/1
10 TABLET, FILM COATED ORAL NIGHTLY
Qty: 90 TABLET | Refills: 1 | OUTPATIENT
Start: 2024-10-21 | End: 2025-04-19

## 2024-10-21 RX ORDER — DULOXETIN HYDROCHLORIDE 60 MG/1
60 CAPSULE, DELAYED RELEASE ORAL DAILY
Qty: 90 CAPSULE | Refills: 1 | OUTPATIENT
Start: 2024-10-21 | End: 2025-04-19

## 2024-10-29 PROBLEM — F10.951: Status: RESOLVED | Noted: 2024-03-02 | Resolved: 2024-10-29

## 2024-10-30 ENCOUNTER — APPOINTMENT (OUTPATIENT)
Dept: PRIMARY CARE | Facility: CLINIC | Age: 54
End: 2024-10-30
Payer: COMMERCIAL

## 2024-10-30 VITALS
WEIGHT: 166.01 LBS | BODY MASS INDEX: 23.82 KG/M2 | SYSTOLIC BLOOD PRESSURE: 118 MMHG | DIASTOLIC BLOOD PRESSURE: 78 MMHG

## 2024-10-30 DIAGNOSIS — I10 HYPERTENSION, UNSPECIFIED TYPE: ICD-10-CM

## 2024-10-30 DIAGNOSIS — R73.9 HYPERGLYCEMIA: Primary | ICD-10-CM

## 2024-10-30 DIAGNOSIS — E78.5 HYPERLIPIDEMIA, UNSPECIFIED HYPERLIPIDEMIA TYPE: ICD-10-CM

## 2024-10-30 DIAGNOSIS — F10.932: ICD-10-CM

## 2024-10-30 DIAGNOSIS — G62.9 PERIPHERAL POLYNEUROPATHY: ICD-10-CM

## 2024-10-30 DIAGNOSIS — N42.89 PROSTATE ASYMMETRY: ICD-10-CM

## 2024-10-30 DIAGNOSIS — N52.9 MALE ERECTILE DISORDER: ICD-10-CM

## 2024-10-30 DIAGNOSIS — M25.562 CHRONIC PAIN OF LEFT KNEE: ICD-10-CM

## 2024-10-30 DIAGNOSIS — M23.207 OLD TEAR OF MENISCUS OF LEFT KNEE, UNSPECIFIED MENISCUS, UNSPECIFIED TEAR TYPE: ICD-10-CM

## 2024-10-30 DIAGNOSIS — I10 PRIMARY HYPERTENSION: ICD-10-CM

## 2024-10-30 DIAGNOSIS — N40.0 PROSTATIC ENLARGEMENT: ICD-10-CM

## 2024-10-30 DIAGNOSIS — Z00.00 HEALTH CARE MAINTENANCE: ICD-10-CM

## 2024-10-30 DIAGNOSIS — F32.A DEPRESSION, UNSPECIFIED DEPRESSION TYPE: ICD-10-CM

## 2024-10-30 DIAGNOSIS — G89.29 CHRONIC PAIN OF LEFT KNEE: ICD-10-CM

## 2024-10-30 PROCEDURE — 3078F DIAST BP <80 MM HG: CPT | Performed by: FAMILY MEDICINE

## 2024-10-30 PROCEDURE — 1036F TOBACCO NON-USER: CPT | Performed by: FAMILY MEDICINE

## 2024-10-30 PROCEDURE — 99214 OFFICE O/P EST MOD 30 MIN: CPT | Performed by: FAMILY MEDICINE

## 2024-10-30 PROCEDURE — 3074F SYST BP LT 130 MM HG: CPT | Performed by: FAMILY MEDICINE

## 2024-10-30 RX ORDER — DULOXETIN HYDROCHLORIDE 60 MG/1
60 CAPSULE, DELAYED RELEASE ORAL DAILY
Qty: 90 CAPSULE | Refills: 1 | Status: SHIPPED | OUTPATIENT
Start: 2024-10-30

## 2024-10-30 RX ORDER — TRAZODONE HYDROCHLORIDE 100 MG/1
100 TABLET ORAL NIGHTLY
Qty: 90 TABLET | Refills: 1 | Status: SHIPPED | OUTPATIENT
Start: 2024-10-30

## 2024-10-30 RX ORDER — LISINOPRIL 10 MG/1
10 TABLET ORAL DAILY
Qty: 90 TABLET | Refills: 1 | Status: SHIPPED | OUTPATIENT
Start: 2024-10-30

## 2024-10-30 RX ORDER — SERTRALINE HYDROCHLORIDE 50 MG/1
50 TABLET, FILM COATED ORAL DAILY
Qty: 90 TABLET | Refills: 1 | Status: SHIPPED | OUTPATIENT
Start: 2024-10-30

## 2024-10-30 RX ORDER — LANOLIN ALCOHOL/MO/W.PET/CERES
100 CREAM (GRAM) TOPICAL DAILY
Qty: 90 TABLET | Refills: 1 | Status: SHIPPED | OUTPATIENT
Start: 2024-10-30

## 2024-10-30 RX ORDER — ATORVASTATIN CALCIUM 10 MG/1
10 TABLET, FILM COATED ORAL NIGHTLY
Qty: 90 TABLET | Refills: 1 | Status: SHIPPED | OUTPATIENT
Start: 2024-10-30

## 2024-10-30 RX ORDER — TADALAFIL 20 MG/1
TABLET ORAL
Qty: 18 TABLET | Refills: 1 | Status: SHIPPED | OUTPATIENT
Start: 2024-10-30

## 2025-03-10 ENCOUNTER — TELEPHONE (OUTPATIENT)
Dept: PRIMARY CARE | Facility: CLINIC | Age: 55
End: 2025-03-10
Payer: COMMERCIAL

## 2025-03-10 DIAGNOSIS — N52.9 MALE ERECTILE DISORDER: ICD-10-CM

## 2025-03-10 NOTE — TELEPHONE ENCOUNTER
Rx Refill Request Telephone Encounter    Name:  Trenton Okeefe  :  [id number]  Medication Name:  tadalafil      Dose, Route, Frequency: As Directed    Dispense Quantity: 18 tablet   Refills: 1        Sig: take 1 tablet by mouth 1 hour before activity as needed       Last refill: 10-30-24      Specific Pharmacy location:  [id number]   Date of last appointment:  10-30-24  Date of next appointment:  25  Best number to reach patient:  [phone]

## 2025-03-11 RX ORDER — TADALAFIL 20 MG/1
TABLET ORAL
Qty: 18 TABLET | Refills: 0 | OUTPATIENT
Start: 2025-03-11

## 2025-03-11 RX ORDER — TADALAFIL 20 MG/1
TABLET ORAL
Qty: 18 TABLET | Refills: 1 | Status: SHIPPED | OUTPATIENT
Start: 2025-03-11

## 2025-04-11 ENCOUNTER — APPOINTMENT (OUTPATIENT)
Dept: PRIMARY CARE | Facility: CLINIC | Age: 55
End: 2025-04-11
Payer: COMMERCIAL

## 2025-05-11 DIAGNOSIS — G62.9 PERIPHERAL POLYNEUROPATHY: ICD-10-CM

## 2025-05-12 RX ORDER — DULOXETIN HYDROCHLORIDE 60 MG/1
60 CAPSULE, DELAYED RELEASE ORAL DAILY
Qty: 30 CAPSULE | Refills: 5 | OUTPATIENT
Start: 2025-05-12

## 2025-06-07 DIAGNOSIS — F10.932: ICD-10-CM

## 2025-06-07 DIAGNOSIS — F32.A DEPRESSION, UNSPECIFIED DEPRESSION TYPE: ICD-10-CM

## 2025-06-08 RX ORDER — TRAZODONE HYDROCHLORIDE 100 MG/1
100 TABLET ORAL NIGHTLY
Qty: 30 TABLET | Refills: 5 | OUTPATIENT
Start: 2025-06-08

## 2025-06-08 RX ORDER — LANOLIN ALCOHOL/MO/W.PET/CERES
100 CREAM (GRAM) TOPICAL DAILY
Qty: 90 TABLET | Refills: 1 | OUTPATIENT
Start: 2025-06-08

## 2025-06-30 DIAGNOSIS — F32.A DEPRESSION, UNSPECIFIED DEPRESSION TYPE: ICD-10-CM

## 2025-07-01 RX ORDER — TRAZODONE HYDROCHLORIDE 100 MG/1
100 TABLET ORAL NIGHTLY
Qty: 30 TABLET | Refills: 5 | OUTPATIENT
Start: 2025-07-01

## 2025-07-16 ENCOUNTER — TELEPHONE (OUTPATIENT)
Dept: PRIMARY CARE | Facility: CLINIC | Age: 55
End: 2025-07-16
Payer: COMMERCIAL

## 2025-07-16 DIAGNOSIS — N52.9 MALE ERECTILE DISORDER: ICD-10-CM

## 2025-07-16 NOTE — TELEPHONE ENCOUNTER
Rx Refill Request Telephone Encounter    Please send a short supply until next apt with JT, ALL of meds listed need to be send to [facility]    Name:  Trenton Okeefe  :  [id number]  Medication Name:  atorvastatin (Lipitor)       Dose: 10 mg   Route: oral   Frequency: Nightly  Dispense Quantity: 90 tablet (90 day supply) Refills: 1   Duration: --   Dispense As Written: No        Sig: Take 1 tablet (10 mg) by mouth once daily at bedtime.       Last refill: 10-30-24      Rx Refill Request Telephone Encounter    Name:  Trenton Okeefe  :  [id number]  Medication Name:  DULoxetine (Cymbalta)   Dose: 60 mg   Route: oral   Frequency: Daily  Dispense Quantity: 90 capsule (90 day supply)  Refills: 1   Duration: --   Dispense As Written: No        Sig: Take 1 capsule (60 mg) by mouth once daily. Do not crush or chew.    Last refill: 10-30-24    Rx Refill Request Telephone Encounter    Name:  Trenton Okeefe  :  [id number]  Medication Name:  lisinopril   Dose: 10 mg   Route: oral   Frequency: Daily  Dispense Quantity: 90 tablet (90 day supply) Refills: 1   Duration: --   Dispense As Written: No        Sig: Take 1 tablet (10 mg) by mouth once daily.    Last refill: 10-30-24    Rx Refill Request Telephone Encounter    Name:  Trenton Okeefe  :  [id number]  Medication Name:  sertraline (Zoloft)  Dose: 50 mg   Route: oral   Frequency: Daily  Dispense Quantity: 90 tablet (90 day supply) Refills: 1   Duration: --   Dispense As Written: No        Sig: Take 1 tablet (50 mg) by mouth once daily.    Last refill: 10-30-24  Rx Refill Request Telephone Encounter    Name:  Trenton Okeefe  :  [id number]  Medication Name:  thiamine  Dose: 100 mg   Route: oral   Frequency: Daily  Dispense Quantity: 90 tablet (90 day supply) Refills: 1   Duration: --   Dispense As Written: No        Sig: Take 1 tablet (100 mg) by mouth once daily.    Last refill: 10-30-24    Rx Refill Request Telephone Encounter    Name:  Trenton Okeefe  :  [id number]  Medication Name:   traZODone (Desyrel)  Dose: 100 mg   Route: oral   Frequency: Nightly  Dispense Quantity: 90 tablet (90 day supply) Refills: 1   Duration: --   Dispense As Written: No        Sig: Take 1 tablet (100 mg) by mouth once daily at bedtime.    Last refill: 10-30-24    Specific Pharmacy location:  [facility]/pharmacy #[id number] [address]   Date of last appointment:  10-30-24  Date of next appointment:  07-26-25  Best number to reach patient:  [phone]

## 2025-07-16 NOTE — TELEPHONE ENCOUNTER
Rx Refill Request Telephone Encounter    This needs to be sent to [facility] that needs to be sent to [facility] . Please and thank you.     Name:  Trenton Okeefe  :  [id number]  Medication Name:  tadalafil   Dose, Route, Frequency: As Directed    Dispense Quantity: 18 tablet   Refills: 1   Duration: --   Dispense As Written: No        Sig: take 1 tablet by mouth 1 hour before activity as needed    Last refill: 25    Specific Pharmacy location:  [id number] [place] [id number]   Date of last appointment:  10-30-24  Date of next appointment:  25  Best number to reach patient:  [phone]

## 2025-07-17 DIAGNOSIS — G62.9 PERIPHERAL POLYNEUROPATHY: ICD-10-CM

## 2025-07-17 DIAGNOSIS — F32.A DEPRESSION, UNSPECIFIED DEPRESSION TYPE: ICD-10-CM

## 2025-07-17 DIAGNOSIS — E78.5 HYPERLIPIDEMIA, UNSPECIFIED HYPERLIPIDEMIA TYPE: ICD-10-CM

## 2025-07-17 DIAGNOSIS — I10 HYPERTENSION, UNSPECIFIED TYPE: ICD-10-CM

## 2025-07-17 RX ORDER — TADALAFIL 20 MG/1
TABLET ORAL
Qty: 9 TABLET | Refills: 0 | Status: SHIPPED | OUTPATIENT
Start: 2025-07-17

## 2025-07-17 RX ORDER — DULOXETIN HYDROCHLORIDE 60 MG/1
60 CAPSULE, DELAYED RELEASE ORAL DAILY
Qty: 30 CAPSULE | Refills: 0 | Status: SHIPPED | OUTPATIENT
Start: 2025-07-17

## 2025-07-17 RX ORDER — ATORVASTATIN CALCIUM 10 MG/1
10 TABLET, FILM COATED ORAL NIGHTLY
Qty: 30 TABLET | Refills: 0 | Status: SHIPPED | OUTPATIENT
Start: 2025-07-17

## 2025-07-17 RX ORDER — LISINOPRIL 10 MG/1
10 TABLET ORAL DAILY
Qty: 30 TABLET | Refills: 0 | Status: SHIPPED | OUTPATIENT
Start: 2025-07-17

## 2025-07-17 RX ORDER — TRAZODONE HYDROCHLORIDE 100 MG/1
100 TABLET ORAL NIGHTLY
Qty: 30 TABLET | Refills: 0 | Status: SHIPPED | OUTPATIENT
Start: 2025-07-17

## 2025-07-17 RX ORDER — SERTRALINE HYDROCHLORIDE 50 MG/1
50 TABLET, FILM COATED ORAL DAILY
Qty: 30 TABLET | Refills: 0 | Status: SHIPPED | OUTPATIENT
Start: 2025-07-17

## 2025-07-25 NOTE — PROGRESS NOTES
"Subjective   Patient ID: [id number]     Trenton Okeefe is a 55 y.o. male who presents for Med Management.    HPI  Taking meds as directed without tolerability or affordability issues.    He has been having trouble with both hips, and his right knee and has an appointment with ortho.     Review of Systems  GEN-denies weakness or myalgias; no unexplained fever or chills  OPTH-No dry eyes, itchy eyes, or blurry vision   ENT-No hearing loss, tinnitus or vertigo  NECK-no stiffness, swelling or pain  PSYCH-No complaints regarding libido, appetite, memory or concentration;  no drug use or alcohol usage since JMAR2024;  he has been \"kind of edgy\";  retired  JUL2024;  ran out of duloxetine  SLEEP-No complaints of insomnia, apnea or restless legs;  patient is waking up rested  ALL/IMMUN-No history of sneezing or itching  HEM-No unexplained bruising or bleeding    Objective     /84 (BP Location: Right arm, Patient Position: Sitting)   Temp 36.8 °C (98.3 °F) (Oral)   Wt 68.7 kg (151 lb 7.3 oz)   BMI 21.73 kg/m²      Physical Exam  General- well defined, well nourished, well hydrated individual in NAD  Skin- normal color and turgor; without nail pitting  Head-normocephalic without masses or tenderness  Eyes-pupils equal round, reactive to light and accommodation, fundi with normal cup/disc ratio, conjunctiva without redness or discharge  Ears-normal pinnae, and canals, with normal landmarks and light reflex of tympanic membranes bilaterally  Nose-septum in the midline, normal mucosa bilaterally  Throat- without erythema or exudate, uvula in midline  Neck-supple without lymphadenopathy or thyromegaly; no carotid bruits  Heart- regular rate and rhythm, normal s1 and s2 without murmur or gallop; peripheral pulses 2+ and symmetrical  Lungs-clear to auscultation  Abdomen-soft, positive bowel sounds, without masses, HSmegaly or pain     Assessment/Plan     Problem List Items Addressed This Visit       Allergic rhinitis "    Depression    Relevant Medications    traZODone (Desyrel) 100 mg tablet    DULoxetine (Cymbalta) 60 mg DR capsule    Hyperglycemia    Hyperlipidemia - Primary    Relevant Medications    atorvastatin (Lipitor) 10 mg tablet    tadalafil 20 mg tablet    thiamine 100 mg tablet    Other Relevant Orders    POCT Lipid Panel manually resulted (Completed)    Hypertension    Relevant Medications    lisinopril 10 mg tablet    Male erectile disorder    Relevant Medications    tadalafil 20 mg tablet    Alcohol use disorder    Idiopathic peripheral neuropathy     Other Visit Diagnoses         Peripheral polyneuropathy        Relevant Medications    DULoxetine (Cymbalta) 60 mg DR capsule      Alcohol withdrawal hallucinosis (Multi)        Relevant Medications    thiamine 100 mg tablet          Follow up fasting (no alcohol for 48 hours and just water for 14 hours) in six months for your next routine appointment.  In general, take any medications on schedule (except for types of Insulin).      Benjamin Quintero MD

## 2025-07-26 ENCOUNTER — APPOINTMENT (OUTPATIENT)
Dept: PRIMARY CARE | Facility: CLINIC | Age: 55
End: 2025-07-26
Payer: COMMERCIAL

## 2025-07-26 VITALS
WEIGHT: 151.46 LBS | DIASTOLIC BLOOD PRESSURE: 84 MMHG | TEMPERATURE: 98.3 F | BODY MASS INDEX: 21.73 KG/M2 | SYSTOLIC BLOOD PRESSURE: 129 MMHG

## 2025-07-26 DIAGNOSIS — G62.9 PERIPHERAL POLYNEUROPATHY: ICD-10-CM

## 2025-07-26 DIAGNOSIS — I10 PRIMARY HYPERTENSION: ICD-10-CM

## 2025-07-26 DIAGNOSIS — F32.A DEPRESSION, UNSPECIFIED DEPRESSION TYPE: ICD-10-CM

## 2025-07-26 DIAGNOSIS — J30.9 ALLERGIC RHINITIS, UNSPECIFIED SEASONALITY, UNSPECIFIED TRIGGER: ICD-10-CM

## 2025-07-26 DIAGNOSIS — G60.9 IDIOPATHIC PERIPHERAL NEUROPATHY: ICD-10-CM

## 2025-07-26 DIAGNOSIS — I10 HYPERTENSION, UNSPECIFIED TYPE: ICD-10-CM

## 2025-07-26 DIAGNOSIS — F10.932: ICD-10-CM

## 2025-07-26 DIAGNOSIS — R73.9 HYPERGLYCEMIA: ICD-10-CM

## 2025-07-26 DIAGNOSIS — F10.90 ALCOHOL USE DISORDER: ICD-10-CM

## 2025-07-26 DIAGNOSIS — N52.9 MALE ERECTILE DISORDER: ICD-10-CM

## 2025-07-26 DIAGNOSIS — E78.5 HYPERLIPIDEMIA, UNSPECIFIED HYPERLIPIDEMIA TYPE: Primary | ICD-10-CM

## 2025-07-26 LAB
POC HDL CHOLESTEROL: 69 MG/DL (ref 0–40)
POC LDL CHOLESTEROL: 68 MG/DL (ref 0–100)
POC NON-HDL CHOLESTEROL: 87 MG/DL (ref 0–130)
POC TOTAL CHOLESTEROL/HDL RATIO: 2.3 (ref 0–4.5)
POC TOTAL CHOLESTEROL: 156 MG/DL (ref 0–199)
POC TRIGLYCERIDES: 94 MG/DL (ref 0–150)

## 2025-07-26 PROCEDURE — 3074F SYST BP LT 130 MM HG: CPT | Performed by: FAMILY MEDICINE

## 2025-07-26 PROCEDURE — 3079F DIAST BP 80-89 MM HG: CPT | Performed by: FAMILY MEDICINE

## 2025-07-26 PROCEDURE — 80061 LIPID PANEL: CPT | Performed by: FAMILY MEDICINE

## 2025-07-26 PROCEDURE — 99214 OFFICE O/P EST MOD 30 MIN: CPT | Performed by: FAMILY MEDICINE

## 2025-07-26 RX ORDER — ATORVASTATIN CALCIUM 10 MG/1
10 TABLET, FILM COATED ORAL NIGHTLY
Qty: 90 TABLET | Refills: 1 | Status: SHIPPED | OUTPATIENT
Start: 2025-07-26

## 2025-07-26 RX ORDER — DULOXETIN HYDROCHLORIDE 60 MG/1
60 CAPSULE, DELAYED RELEASE ORAL DAILY
Qty: 90 CAPSULE | Refills: 1 | Status: SHIPPED | OUTPATIENT
Start: 2025-07-26

## 2025-07-26 RX ORDER — SERTRALINE HYDROCHLORIDE 50 MG/1
50 TABLET, FILM COATED ORAL DAILY
Qty: 90 TABLET | Refills: 1 | Status: SHIPPED | OUTPATIENT
Start: 2025-07-26 | End: 2025-07-26

## 2025-07-26 RX ORDER — TADALAFIL 20 MG/1
TABLET ORAL
Qty: 27 TABLET | Refills: 1 | Status: SHIPPED | OUTPATIENT
Start: 2025-07-26 | End: 2025-08-01 | Stop reason: SDUPTHER

## 2025-07-26 RX ORDER — TRAZODONE HYDROCHLORIDE 100 MG/1
100 TABLET ORAL NIGHTLY
Qty: 90 TABLET | Refills: 1 | Status: SHIPPED | OUTPATIENT
Start: 2025-07-26

## 2025-07-26 RX ORDER — LANOLIN ALCOHOL/MO/W.PET/CERES
100 CREAM (GRAM) TOPICAL DAILY
Qty: 90 TABLET | Refills: 1 | Status: SHIPPED | OUTPATIENT
Start: 2025-07-26

## 2025-07-26 RX ORDER — LISINOPRIL 10 MG/1
10 TABLET ORAL DAILY
Qty: 90 TABLET | Refills: 1 | Status: SHIPPED | OUTPATIENT
Start: 2025-07-26

## 2025-07-26 RX ORDER — DULOXETIN HYDROCHLORIDE 60 MG/1
60 CAPSULE, DELAYED RELEASE ORAL DAILY
Qty: 90 CAPSULE | Refills: 1 | Status: SHIPPED | OUTPATIENT
Start: 2025-07-26 | End: 2025-07-26 | Stop reason: SDUPTHER

## 2025-07-31 ENCOUNTER — TELEPHONE (OUTPATIENT)
Dept: PRIMARY CARE | Facility: CLINIC | Age: 55
End: 2025-07-31
Payer: COMMERCIAL

## 2025-07-31 DIAGNOSIS — N52.9 MALE ERECTILE DISORDER: ICD-10-CM

## 2025-07-31 NOTE — TELEPHONE ENCOUNTER
Patient wanted to know if his tadalafil 20mg could be increased. Either way, he needs his tadalafil sent to  in [place].

## 2025-08-03 RX ORDER — TADALAFIL 20 MG/1
TABLET ORAL
Qty: 27 TABLET | Refills: 1 | Status: SHIPPED | OUTPATIENT
Start: 2025-08-03

## 2025-08-22 DIAGNOSIS — F32.A DEPRESSION, UNSPECIFIED DEPRESSION TYPE: ICD-10-CM

## 2025-08-22 DIAGNOSIS — I10 HYPERTENSION, UNSPECIFIED TYPE: ICD-10-CM

## 2025-08-25 RX ORDER — SERTRALINE HYDROCHLORIDE 50 MG/1
50 TABLET, FILM COATED ORAL DAILY
Qty: 90 TABLET | Refills: 1 | OUTPATIENT
Start: 2025-08-25

## 2025-08-25 RX ORDER — LISINOPRIL 10 MG/1
10 TABLET ORAL DAILY
Qty: 90 TABLET | Refills: 1 | OUTPATIENT
Start: 2025-08-25

## 2025-09-03 ENCOUNTER — OFFICE VISIT (OUTPATIENT)
Age: 55
End: 2025-09-03
Payer: COMMERCIAL

## 2025-09-03 VITALS
HEIGHT: 70 IN | RESPIRATION RATE: 16 BRPM | TEMPERATURE: 98.4 F | OXYGEN SATURATION: 96 % | WEIGHT: 155 LBS | HEART RATE: 65 BPM | SYSTOLIC BLOOD PRESSURE: 127 MMHG | DIASTOLIC BLOOD PRESSURE: 72 MMHG | BODY MASS INDEX: 22.19 KG/M2

## 2025-09-03 DIAGNOSIS — L24.9 IRRITANT CONTACT DERMATITIS, UNSPECIFIED TRIGGER: Primary | ICD-10-CM

## 2025-09-03 RX ORDER — METHYLPREDNISOLONE 4 MG/1
TABLET ORAL
Qty: 21 TABLET | Refills: 0 | Status: SHIPPED | OUTPATIENT
Start: 2025-09-03 | End: 2025-09-09

## 2025-09-03 RX ORDER — METHYLPREDNISOLONE SODIUM SUCCINATE 125 MG/2ML
125 INJECTION INTRAMUSCULAR; INTRAVENOUS ONCE
Status: COMPLETED | OUTPATIENT
Start: 2025-09-03 | End: 2025-09-03

## 2025-09-03 RX ADMIN — METHYLPREDNISOLONE SODIUM SUCCINATE 125 MG: 125 INJECTION INTRAMUSCULAR; INTRAVENOUS at 17:29

## 2025-09-03 ASSESSMENT — ENCOUNTER SYMPTOMS
CARDIOVASCULAR NEGATIVE: 1
ALLERGIC/IMMUNOLOGIC NEGATIVE: 1
CONSTITUTIONAL NEGATIVE: 1
HEMATOLOGIC/LYMPHATIC NEGATIVE: 1
NEUROLOGICAL NEGATIVE: 1
GASTROINTESTINAL NEGATIVE: 1
ENDOCRINE NEGATIVE: 1
EYES NEGATIVE: 1
MUSCULOSKELETAL NEGATIVE: 1
RESPIRATORY NEGATIVE: 1
PSYCHIATRIC NEGATIVE: 1

## 2025-09-03 ASSESSMENT — PAIN SCALES - GENERAL: PAINLEVEL_OUTOF10: 0-NO PAIN

## 2025-09-03 ASSESSMENT — PATIENT HEALTH QUESTIONNAIRE - PHQ9
SUM OF ALL RESPONSES TO PHQ9 QUESTIONS 1 AND 2: 0
2. FEELING DOWN, DEPRESSED OR HOPELESS: NOT AT ALL
1. LITTLE INTEREST OR PLEASURE IN DOING THINGS: NOT AT ALL